# Patient Record
Sex: FEMALE | Race: WHITE | NOT HISPANIC OR LATINO | Employment: OTHER | URBAN - NONMETROPOLITAN AREA
[De-identification: names, ages, dates, MRNs, and addresses within clinical notes are randomized per-mention and may not be internally consistent; named-entity substitution may affect disease eponyms.]

---

## 2017-04-25 ENCOUNTER — TRANSCRIBE ORDERS (OUTPATIENT)
Dept: ADMINISTRATIVE | Facility: HOSPITAL | Age: 72
End: 2017-04-25

## 2017-04-25 DIAGNOSIS — Z12.31 VISIT FOR SCREENING MAMMOGRAM: Primary | ICD-10-CM

## 2017-04-25 DIAGNOSIS — H53.453 OTHER LOCALIZED VISUAL FIELD DEFECT, BILATERAL: ICD-10-CM

## 2017-05-01 ENCOUNTER — HOSPITAL ENCOUNTER (OUTPATIENT)
Dept: MRI IMAGING | Facility: HOSPITAL | Age: 72
Discharge: HOME | End: 2017-05-01
Admitting: FAMILY MEDICINE

## 2017-05-01 ENCOUNTER — HOSPITAL ENCOUNTER (OUTPATIENT)
Dept: MAMMOGRAPHY | Facility: HOSPITAL | Age: 72
Discharge: HOME | End: 2017-05-01

## 2017-05-01 DIAGNOSIS — Z12.31 VISIT FOR SCREENING MAMMOGRAM: ICD-10-CM

## 2017-05-01 DIAGNOSIS — H53.453 OTHER LOCALIZED VISUAL FIELD DEFECT, BILATERAL: ICD-10-CM

## 2017-05-01 PROCEDURE — G0202 SCR MAMMO BI INCL CAD: HCPCS | Performed by: RADIOLOGY

## 2017-05-01 PROCEDURE — G0202 SCR MAMMO BI INCL CAD: HCPCS

## 2017-05-01 PROCEDURE — 77063 BREAST TOMOSYNTHESIS BI: CPT | Performed by: RADIOLOGY

## 2017-05-01 PROCEDURE — 70551 MRI BRAIN STEM W/O DYE: CPT | Performed by: RADIOLOGY

## 2017-05-01 PROCEDURE — 77063 BREAST TOMOSYNTHESIS BI: CPT

## 2017-05-01 PROCEDURE — 70551 MRI BRAIN STEM W/O DYE: CPT

## 2017-08-17 ENCOUNTER — TRANSCRIBE ORDERS (OUTPATIENT)
Dept: ADMINISTRATIVE | Facility: HOSPITAL | Age: 72
End: 2017-08-17

## 2017-08-17 DIAGNOSIS — R07.9 CHEST PAIN, UNSPECIFIED: Primary | ICD-10-CM

## 2017-08-31 ENCOUNTER — HOSPITAL ENCOUNTER (OUTPATIENT)
Dept: NUCLEAR MEDICINE | Facility: HOSPITAL | Age: 72
Discharge: HOME | End: 2017-08-31

## 2017-08-31 ENCOUNTER — HOSPITAL ENCOUNTER (OUTPATIENT)
Dept: CARDIOLOGY | Facility: HOSPITAL | Age: 72
Discharge: HOME | End: 2017-08-31

## 2017-08-31 DIAGNOSIS — R07.9 CHEST PAIN, UNSPECIFIED: ICD-10-CM

## 2017-08-31 LAB
BH CV NUCLEAR PRIOR STUDY: 3
BH CV STRESS BP STAGE 1: NORMAL
BH CV STRESS DURATION MIN STAGE 1: 3
BH CV STRESS DURATION SEC STAGE 1: 0
BH CV STRESS GRADE STAGE 1: 10
BH CV STRESS GRADE STAGE 2: 12
BH CV STRESS HR STAGE 1: 149
BH CV STRESS METS STAGE 1: 5
BH CV STRESS PROTOCOL 1: NORMAL
BH CV STRESS RECOVERY BP: NORMAL MMHG
BH CV STRESS RECOVERY HR: 84 BPM
BH CV STRESS SPEED STAGE 1: 1.7
BH CV STRESS SPEED STAGE 2: 2.5
BH CV STRESS STAGE 1: 1
BH CV STRESS STAGE 2: 2
MAXIMAL PREDICTED HEART RATE: 150 BPM
PERCENT MAX PREDICTED HR: 99.33 %
STRESS BASELINE BP: NORMAL MMHG
STRESS BASELINE HR: 68 BPM
STRESS PERCENT HR: 117 %
STRESS POST ESTIMATED WORKLOAD: 4.6 METS
STRESS POST EXERCISE DUR MIN: 3 MIN
STRESS POST EXERCISE DUR SEC: 0 SEC
STRESS POST PEAK BP: NORMAL MMHG
STRESS POST PEAK HR: 149 BPM
STRESS TARGET HR: 128 BPM

## 2017-08-31 PROCEDURE — A9500 TC99M SESTAMIBI: HCPCS | Performed by: FAMILY MEDICINE

## 2017-08-31 PROCEDURE — 0 TECHNETIUM SESTAMIBI: Performed by: FAMILY MEDICINE

## 2017-08-31 PROCEDURE — 93018 CV STRESS TEST I&R ONLY: CPT | Performed by: INTERNAL MEDICINE

## 2017-08-31 PROCEDURE — 78452 HT MUSCLE IMAGE SPECT MULT: CPT | Performed by: INTERNAL MEDICINE

## 2017-08-31 PROCEDURE — 93017 CV STRESS TEST TRACING ONLY: CPT

## 2017-08-31 PROCEDURE — 78452 HT MUSCLE IMAGE SPECT MULT: CPT

## 2017-08-31 RX ADMIN — TECHNETIUM TC-99M SESTAMIBI 1 DOSE: 1 INJECTION INTRAVENOUS at 11:30

## 2017-08-31 RX ADMIN — TECHNETIUM TC-99M SESTAMIBI 1 DOSE: 1 INJECTION INTRAVENOUS at 10:20

## 2018-06-15 ENCOUNTER — HOSPITAL ENCOUNTER (OUTPATIENT)
Dept: MAMMOGRAPHY | Facility: HOSPITAL | Age: 73
Discharge: HOME | End: 2018-06-15
Admitting: NURSE PRACTITIONER

## 2018-06-15 DIAGNOSIS — Z12.39 BREAST CANCER SCREENING: ICD-10-CM

## 2018-06-15 PROCEDURE — 77063 BREAST TOMOSYNTHESIS BI: CPT

## 2018-06-15 PROCEDURE — 77067 SCR MAMMO BI INCL CAD: CPT | Performed by: RADIOLOGY

## 2018-06-15 PROCEDURE — 77067 SCR MAMMO BI INCL CAD: CPT

## 2018-06-15 PROCEDURE — 77063 BREAST TOMOSYNTHESIS BI: CPT | Performed by: RADIOLOGY

## 2019-02-20 ENCOUNTER — APPOINTMENT (OUTPATIENT)
Dept: CT IMAGING | Facility: HOSPITAL | Age: 74
End: 2019-02-20

## 2019-02-20 ENCOUNTER — HOSPITAL ENCOUNTER (EMERGENCY)
Facility: HOSPITAL | Age: 74
Discharge: HOME | End: 2019-02-21
Attending: EMERGENCY MEDICINE | Admitting: EMERGENCY MEDICINE

## 2019-02-20 DIAGNOSIS — K52.9 COLITIS: Primary | ICD-10-CM

## 2019-02-20 LAB
ALBUMIN SERPL-MCNC: 4.4 G/DL (ref 3.4–4.8)
ALBUMIN/GLOB SERPL: 1.3 G/DL (ref 1.5–2.5)
ALP SERPL-CCNC: 121 U/L (ref 35–104)
ALT SERPL W P-5'-P-CCNC: 33 U/L (ref 10–36)
ANION GAP SERPL CALCULATED.3IONS-SCNC: 8.4 MMOL/L (ref 3.6–11.2)
AST SERPL-CCNC: 28 U/L (ref 10–30)
BACTERIA UR QL AUTO: ABNORMAL /HPF
BASOPHILS # BLD AUTO: 0.01 10*3/MM3 (ref 0–0.3)
BASOPHILS NFR BLD AUTO: 0.1 % (ref 0–2)
BILIRUB SERPL-MCNC: 0.9 MG/DL (ref 0.2–1.8)
BILIRUB UR QL STRIP: NEGATIVE
BUN BLD-MCNC: 15 MG/DL (ref 7–21)
BUN/CREAT SERPL: 19.5 (ref 7–25)
CALCIUM SPEC-SCNC: 9.6 MG/DL (ref 7.7–10)
CHLORIDE SERPL-SCNC: 103 MMOL/L (ref 99–112)
CLARITY UR: CLEAR
CO2 SERPL-SCNC: 26.6 MMOL/L (ref 24.3–31.9)
COLOR UR: YELLOW
CREAT BLD-MCNC: 0.77 MG/DL (ref 0.43–1.29)
D-LACTATE SERPL-SCNC: 1.5 MMOL/L (ref 0.5–2)
DEPRECATED RDW RBC AUTO: 43.2 FL (ref 37–54)
DEVELOPER EXPIRATION DATE: ABNORMAL
DEVELOPER LOT NUMBER: ABNORMAL
EOSINOPHIL # BLD AUTO: 0.04 10*3/MM3 (ref 0–0.7)
EOSINOPHIL NFR BLD AUTO: 0.2 % (ref 0–7)
ERYTHROCYTE [DISTWIDTH] IN BLOOD BY AUTOMATED COUNT: 13 % (ref 11.5–14.5)
EXPIRATION DATE: ABNORMAL
FECAL OCCULT BLOOD SCREEN, POC: POSITIVE
GFR SERPL CREATININE-BSD FRML MDRD: 74 ML/MIN/1.73
GLOBULIN UR ELPH-MCNC: 3.3 GM/DL
GLUCOSE BLD-MCNC: 117 MG/DL (ref 70–110)
GLUCOSE UR STRIP-MCNC: NEGATIVE MG/DL
HCT VFR BLD AUTO: 43.2 % (ref 37–47)
HGB BLD-MCNC: 14.1 G/DL (ref 12–16)
HGB UR QL STRIP.AUTO: ABNORMAL
HYALINE CASTS UR QL AUTO: ABNORMAL /LPF
IMM GRANULOCYTES # BLD AUTO: 0.05 10*3/MM3 (ref 0–0.03)
IMM GRANULOCYTES NFR BLD AUTO: 0.3 % (ref 0–0.5)
KETONES UR QL STRIP: NEGATIVE
LEUKOCYTE ESTERASE UR QL STRIP.AUTO: ABNORMAL
LIPASE SERPL-CCNC: 40 U/L (ref 13–60)
LYMPHOCYTES # BLD AUTO: 1.98 10*3/MM3 (ref 1–3)
LYMPHOCYTES NFR BLD AUTO: 12 % (ref 16–46)
Lab: ABNORMAL
MCH RBC QN AUTO: 30.9 PG (ref 27–33)
MCHC RBC AUTO-ENTMCNC: 32.6 G/DL (ref 33–37)
MCV RBC AUTO: 94.7 FL (ref 80–94)
MONOCYTES # BLD AUTO: 1.55 10*3/MM3 (ref 0.1–0.9)
MONOCYTES NFR BLD AUTO: 9.4 % (ref 0–12)
NEGATIVE CONTROL: NEGATIVE
NEUTROPHILS # BLD AUTO: 12.92 10*3/MM3 (ref 1.4–6.5)
NEUTROPHILS NFR BLD AUTO: 78 % (ref 40–75)
NITRITE UR QL STRIP: NEGATIVE
OSMOLALITY SERPL CALC.SUM OF ELEC: 277.5 MOSM/KG (ref 273–305)
PH UR STRIP.AUTO: 7 [PH] (ref 5–8)
PLATELET # BLD AUTO: 247 10*3/MM3 (ref 130–400)
PMV BLD AUTO: 10.2 FL (ref 6–10)
POSITIVE CONTROL: POSITIVE
POTASSIUM BLD-SCNC: 3.9 MMOL/L (ref 3.5–5.3)
PROT SERPL-MCNC: 7.7 G/DL (ref 6–8)
PROT UR QL STRIP: NEGATIVE
RBC # BLD AUTO: 4.56 10*6/MM3 (ref 4.2–5.4)
RBC # UR: ABNORMAL /HPF
REF LAB TEST METHOD: ABNORMAL
SODIUM BLD-SCNC: 138 MMOL/L (ref 135–153)
SP GR UR STRIP: >1.03 (ref 1–1.03)
SQUAMOUS #/AREA URNS HPF: ABNORMAL /HPF
UROBILINOGEN UR QL STRIP: ABNORMAL
WBC NRBC COR # BLD: 16.55 10*3/MM3 (ref 4.5–12.5)
WBC UR QL AUTO: ABNORMAL /HPF

## 2019-02-20 PROCEDURE — 96366 THER/PROPH/DIAG IV INF ADDON: CPT

## 2019-02-20 PROCEDURE — 96365 THER/PROPH/DIAG IV INF INIT: CPT

## 2019-02-20 PROCEDURE — 25010000002 IOPAMIDOL 61 % SOLUTION: Performed by: EMERGENCY MEDICINE

## 2019-02-20 PROCEDURE — 83605 ASSAY OF LACTIC ACID: CPT | Performed by: EMERGENCY MEDICINE

## 2019-02-20 PROCEDURE — 74177 CT ABD & PELVIS W/CONTRAST: CPT | Performed by: RADIOLOGY

## 2019-02-20 PROCEDURE — 36415 COLL VENOUS BLD VENIPUNCTURE: CPT

## 2019-02-20 PROCEDURE — 96375 TX/PRO/DX INJ NEW DRUG ADDON: CPT

## 2019-02-20 PROCEDURE — 25010000002 ONDANSETRON PER 1 MG: Performed by: EMERGENCY MEDICINE

## 2019-02-20 PROCEDURE — 83690 ASSAY OF LIPASE: CPT | Performed by: EMERGENCY MEDICINE

## 2019-02-20 PROCEDURE — 85025 COMPLETE CBC W/AUTO DIFF WBC: CPT | Performed by: EMERGENCY MEDICINE

## 2019-02-20 PROCEDURE — 74177 CT ABD & PELVIS W/CONTRAST: CPT

## 2019-02-20 PROCEDURE — 82270 OCCULT BLOOD FECES: CPT | Performed by: EMERGENCY MEDICINE

## 2019-02-20 PROCEDURE — 81001 URINALYSIS AUTO W/SCOPE: CPT | Performed by: EMERGENCY MEDICINE

## 2019-02-20 PROCEDURE — 99284 EMERGENCY DEPT VISIT MOD MDM: CPT

## 2019-02-20 PROCEDURE — 25010000002 LEVOFLOXACIN PER 250 MG: Performed by: EMERGENCY MEDICINE

## 2019-02-20 PROCEDURE — 96361 HYDRATE IV INFUSION ADD-ON: CPT

## 2019-02-20 PROCEDURE — 80053 COMPREHEN METABOLIC PANEL: CPT | Performed by: EMERGENCY MEDICINE

## 2019-02-20 PROCEDURE — 96368 THER/DIAG CONCURRENT INF: CPT

## 2019-02-20 RX ORDER — LEVOFLOXACIN 5 MG/ML
750 INJECTION, SOLUTION INTRAVENOUS ONCE
Status: COMPLETED | OUTPATIENT
Start: 2019-02-20 | End: 2019-02-21

## 2019-02-20 RX ORDER — DICYCLOMINE HCL 20 MG
20 TABLET ORAL EVERY 6 HOURS PRN
Qty: 20 TABLET | Refills: 0 | Status: SHIPPED | OUTPATIENT
Start: 2019-02-20

## 2019-02-20 RX ORDER — ONDANSETRON 2 MG/ML
4 INJECTION INTRAMUSCULAR; INTRAVENOUS ONCE
Status: COMPLETED | OUTPATIENT
Start: 2019-02-20 | End: 2019-02-20

## 2019-02-20 RX ORDER — SODIUM CHLORIDE 9 MG/ML
100 INJECTION, SOLUTION INTRAVENOUS CONTINUOUS
Status: DISCONTINUED | OUTPATIENT
Start: 2019-02-20 | End: 2019-02-21 | Stop reason: HOSPADM

## 2019-02-20 RX ORDER — ONDANSETRON 4 MG/1
4 TABLET, ORALLY DISINTEGRATING ORAL 4 TIMES DAILY
Qty: 15 TABLET | Refills: 0 | Status: SHIPPED | OUTPATIENT
Start: 2019-02-20

## 2019-02-20 RX ORDER — SODIUM CHLORIDE 0.9 % (FLUSH) 0.9 %
10 SYRINGE (ML) INJECTION AS NEEDED
Status: DISCONTINUED | OUTPATIENT
Start: 2019-02-20 | End: 2019-02-21 | Stop reason: HOSPADM

## 2019-02-20 RX ORDER — CIPROFLOXACIN 500 MG/1
500 TABLET, FILM COATED ORAL 2 TIMES DAILY
Qty: 20 TABLET | Refills: 0 | Status: SHIPPED | OUTPATIENT
Start: 2019-02-20

## 2019-02-20 RX ORDER — METRONIDAZOLE 500 MG/1
500 TABLET ORAL 3 TIMES DAILY
Qty: 30 TABLET | Refills: 0 | Status: SHIPPED | OUTPATIENT
Start: 2019-02-20

## 2019-02-20 RX ADMIN — IOPAMIDOL 90 ML: 612 INJECTION, SOLUTION INTRAVENOUS at 21:42

## 2019-02-20 RX ADMIN — ONDANSETRON 4 MG: 2 INJECTION, SOLUTION INTRAMUSCULAR; INTRAVENOUS at 20:55

## 2019-02-20 RX ADMIN — METRONIDAZOLE 500 MG: 500 INJECTION, SOLUTION INTRAVENOUS at 23:12

## 2019-02-20 RX ADMIN — SODIUM CHLORIDE 100 ML/HR: 9 INJECTION, SOLUTION INTRAVENOUS at 20:55

## 2019-02-20 RX ADMIN — LEVOFLOXACIN 750 MG: 5 INJECTION, SOLUTION INTRAVENOUS at 23:39

## 2019-02-21 VITALS
HEART RATE: 79 BPM | DIASTOLIC BLOOD PRESSURE: 55 MMHG | RESPIRATION RATE: 18 BRPM | OXYGEN SATURATION: 97 % | BODY MASS INDEX: 18.43 KG/M2 | WEIGHT: 104 LBS | SYSTOLIC BLOOD PRESSURE: 116 MMHG | HEIGHT: 63 IN | TEMPERATURE: 97.7 F

## 2019-02-21 NOTE — ED NOTES
Pt left AC is swollen around IV site that infiltrated, pt has full ROM of extremity, denies tenderness to area and there is no discoloration to area noted, no blistering, necrosis or sloughing noted. Provider made aware, states pt is fine to go home. I advised pt to apply warm compress to reduce swelling, if condition gets worse she can return to ER. PT denies any further questions.      Geraldine Matta, RN  02/21/19 0126

## 2019-02-21 NOTE — ED NOTES
Pt resting quietly on stretcher with no complaints.  Pt AAOx4 with no resp distress noted, respirations even and unlabored.  Pt denies any needs at this time.  Skin PWD.  Pt family at bedside. Will continue to monitor and follow plan of care.  Bed rails up x2, bed in lowest position, call light in reach.           Geraldine Matta, RN  02/21/19 [id number]

## 2019-02-21 NOTE — ED PROVIDER NOTES
Subjective   Pt comes in with c/o BRBPR.  Pt reports that she felt severe nausea yesterday.  She later had diarrhea.  Today she had dark red blood and clots in diarrhea.  No fever.  No injury.  Had 1 polyp removed many years ago.  No blood thinners.        History provided by:  Patient and relative  Rectal Bleeding   Quality:  Maroon  Amount:  Moderate  Duration:  1 day  Timing:  Intermittent  Chronicity:  New  Context: defecation and diarrhea    Context: not anal fissures, not anal penetration, not constipation, not foreign body, not hemorrhoids, not rectal injury, not rectal pain and not spontaneously    Similar prior episodes: no    Relieved by:  Nothing  Worsened by:  Defecation  Ineffective treatments:  None tried  Associated symptoms: abdominal pain, dizziness and light-headedness    Associated symptoms: no epistaxis, no fever, no hematemesis, no loss of consciousness, no recent illness and no vomiting    Risk factors: no anticoagulant use, no hx of colorectal cancer, no hx of colorectal surgery, no hx of IBD, no NSAID use and no steroid use        Review of Systems   Constitutional: Positive for fatigue. Negative for activity change, appetite change, chills, diaphoresis and fever.   HENT: Negative.  Negative for nosebleeds.    Eyes: Negative.    Respiratory: Negative.  Negative for chest tightness and shortness of breath.    Cardiovascular: Negative.  Negative for chest pain, palpitations and leg swelling.   Gastrointestinal: Positive for abdominal pain, blood in stool, constipation, diarrhea and hematochezia. Negative for abdominal distention, hematemesis and vomiting.   Endocrine: Negative.    Genitourinary: Negative.    Musculoskeletal: Negative.    Skin: Negative.    Allergic/Immunologic: Negative.    Neurological: Positive for dizziness, weakness and light-headedness. Negative for loss of consciousness.   Hematological: Negative.    Psychiatric/Behavioral: Negative.    All other systems reviewed and are  negative.      No past medical history on file.    No Known Allergies    No past surgical history on file.    Family History   Problem Relation Age of Onset   • Breast cancer Sister 47       Social History     Socioeconomic History   • Marital status:      Spouse name: Not on file   • Number of children: Not on file   • Years of education: Not on file   • Highest education level: Not on file   Tobacco Use   • Smoking status: Never Smoker           Objective   Physical Exam   Constitutional: She is oriented to person, place, and time. She appears well-developed and well-nourished. No distress.   HENT:   Head: Normocephalic and atraumatic.   Nose: Nose normal.   Mouth/Throat: Oropharynx is clear and moist.   Eyes: Conjunctivae and EOM are normal. Right eye exhibits no discharge. Left eye exhibits no discharge. No scleral icterus.   Neck: Normal range of motion. Neck supple. No tracheal deviation present.   Cardiovascular: Normal rate, regular rhythm, normal heart sounds and intact distal pulses. Exam reveals no gallop and no friction rub.   No murmur heard.  Pulmonary/Chest: Effort normal and breath sounds normal. No stridor. No respiratory distress. She has no wheezes. She has no rales.   Abdominal: Soft. Bowel sounds are normal. She exhibits no distension and no mass. There is tenderness. There is no guarding.   Low abdominal mild tenderness to palpation   Genitourinary: Rectal exam shows guaiac positive stool.   Genitourinary Comments: No CVAT    Rectal performed with TENZIN Matta  Small non-engorged, non-thrombosed, Non-bleeding left lateral hemorrhoid.  Normal tone.  Soft/liquid dark maroon grossly positive stool with + hemoccult.   Musculoskeletal: Normal range of motion.   Neurological: She is alert and oriented to person, place, and time. No sensory deficit. She exhibits normal muscle tone. Coordination normal.   Skin: Skin is warm and dry. Capillary refill takes less than 2 seconds. She is not  diaphoretic. No pallor.   Psychiatric: She has a normal mood and affect. Her behavior is normal. Judgment and thought content normal.   Nursing note and vitals reviewed.      Procedures           ED Course      CT Abdomen Pelvis With Contrast   ED Interpretation   ET abdomen and pelvis with IV contrast   Faxed report from virtual radiologic   Impression:   1.  Diffuse wall thickening of the majority of the transverse colon with mild pericolonic stranding indicating a colitis.   2.  Scattered: Diverticuli without evidence of diverticulitis.   3.  Approximate 7.1 x 8.0 x 7.3 cm subserosal leiomyoma of the anterior uterus.   Signed Tuan Truong M.D.        Labs Reviewed   COMPREHENSIVE METABOLIC PANEL - Abnormal; Notable for the following components:       Result Value    Glucose 117 (*)     Alkaline Phosphatase 121 (*)     A/G Ratio 1.3 (*)     All other components within normal limits    Narrative:     The MDRD GFR formula is only valid for adults with stable renal function between ages 18 and 70.   URINALYSIS W/ MICROSCOPIC IF INDICATED (NO CULTURE) - Abnormal; Notable for the following components:    Specific Gravity, UA >1.030 (*)     Blood, UA Small (1+) (*)     Leuk Esterase, UA Moderate (2+) (*)     All other components within normal limits   CBC WITH AUTO DIFFERENTIAL - Abnormal; Notable for the following components:    WBC 16.55 (*)     MCV 94.7 (*)     MCHC 32.6 (*)     MPV 10.2 (*)     Neutrophil % 78.0 (*)     Lymphocyte % 12.0 (*)     Neutrophils, Absolute 12.92 (*)     Monocytes, Absolute 1.55 (*)     Immature Grans, Absolute 0.05 (*)     All other components within normal limits   URINALYSIS, MICROSCOPIC ONLY - Abnormal; Notable for the following components:    RBC, UA 6-12 (*)     WBC, UA 13-20 (*)     All other components within normal limits   POCT OCCULT BLOOD STOOL - Abnormal; Notable for the following components:    Fecal Occult Blood Positive (*)     All other components within normal limits    LIPASE - Normal   LACTIC ACID, PLASMA - Normal   OSMOLALITY, CALCULATED - Normal   CBC AND DIFFERENTIAL    Narrative:     The following orders were created for panel order CBC & Differential.  Procedure                               Abnormality         Status                     ---------                               -----------         ------                     CBC Auto Differential[[id number]]        Abnormal            Final result                 Please view results for these tests on the individual orders.        Medication List      START taking these medications    ciprofloxacin 500 MG tablet  Commonly known as:  CIPRO  Take 1 tablet by mouth 2 (Two) Times a Day.     dicyclomine 20 MG tablet  Commonly known as:  BENTYL  Take 1 tablet by mouth Every 6 (Six) Hours As Needed (abdominal cramps).     metroNIDAZOLE 500 MG tablet  Commonly known as:  FLAGYL  Take 1 tablet by mouth 3 (Three) Times a Day.     ondansetron ODT 4 MG disintegrating tablet  Commonly known as:  ZOFRAN-ODT  Take 1 tablet by mouth 4 (Four) Times a Day.                    MDM  Number of Diagnoses or Management Options  Colitis: new and requires workup     Amount and/or Complexity of Data Reviewed  Clinical lab tests: ordered and reviewed  Tests in the radiology section of CPT®: ordered and reviewed  Independent visualization of images, tracings, or specimens: yes    Risk of Complications, Morbidity, and/or Mortality  Presenting problems: high  Diagnostic procedures: high  Management options: high    Patient Progress  Patient progress: stable        Final diagnoses:   Colitis            Corby Delgadillo MD  02/20/19 [id number]

## 2019-02-21 NOTE — ED NOTES
Pt resting quietly on stretcher with no complaints.  Pt AAOx4 with no resp distress noted, respirations even and unlabored.  Pt denies any needs at this time.  Skin PWD.  Pt family at bedside. Will continue to monitor and follow plan of care.  Bed rails up x2, bed in lowest position, call light in reach.           Geraldine Matta, RN  02/21/19 0054

## 2019-02-21 NOTE — ED NOTES
Confirmed IV compatability for Levaquin and Flagyl via [employer].      Geraldine Matta, RN  02/20/19 6708

## 2019-02-21 NOTE — ED NOTES
I gave the patient a urine cup and informed her we need a sample, patient doesn't believe she can go right now because has not had much to drink today.     Eduin Chaparro  02/20/19 2110

## 2019-02-21 NOTE — ED NOTES
Pt resting quietly on stretcher with no complaints.  Pt AAOx4 with no resp distress noted, respirations even and unlabored.  Pt denies any needs at this time.  Skin PWD.  Pt family at bedside. Will continue to monitor and follow plan of care.  Bed rails up x2, bed in lowest position, call light in reach.           Geraldine Matta, RN  02/21/19 2324

## 2019-02-21 NOTE — ED NOTES
Pt iv infiltrated during CT, radiology tech reports that it occurred at the end of the scan and only around 15 ml of contrast was administered after the IV blew. Area around the IV is slightly raised, blue and slightly tender to the touch. Provider made aware, orders initiated.      Geraldine Matta, RN  02/20/19 2958

## 2019-03-21 ENCOUNTER — TRANSCRIBE ORDERS (OUTPATIENT)
Dept: ADMINISTRATIVE | Facility: HOSPITAL | Age: 74
End: 2019-03-21

## 2019-03-21 DIAGNOSIS — N85.2 HYPERTROPHY OF UTERUS: Primary | ICD-10-CM

## 2019-03-27 ENCOUNTER — HOSPITAL ENCOUNTER (OUTPATIENT)
Dept: ULTRASOUND IMAGING | Facility: HOSPITAL | Age: 74
Discharge: HOME | End: 2019-03-27
Admitting: FAMILY MEDICINE

## 2019-03-27 DIAGNOSIS — N85.2 HYPERTROPHY OF UTERUS: ICD-10-CM

## 2019-03-27 PROCEDURE — 76830 TRANSVAGINAL US NON-OB: CPT | Performed by: RADIOLOGY

## 2019-03-27 PROCEDURE — 76830 TRANSVAGINAL US NON-OB: CPT

## 2019-04-25 ENCOUNTER — TRANSCRIBE ORDERS (OUTPATIENT)
Dept: ADMINISTRATIVE | Facility: HOSPITAL | Age: 74
End: 2019-04-25

## 2019-04-25 DIAGNOSIS — R63.4 ABNORMAL WEIGHT LOSS: Primary | ICD-10-CM

## 2019-04-29 ENCOUNTER — HOSPITAL ENCOUNTER (OUTPATIENT)
Dept: CT IMAGING | Facility: HOSPITAL | Age: 74
Discharge: HOME | End: 2019-04-29
Admitting: NURSE PRACTITIONER

## 2019-04-29 DIAGNOSIS — R63.4 ABNORMAL WEIGHT LOSS: ICD-10-CM

## 2019-04-29 LAB — CREAT BLDA-MCNC: 0.7 MG/DL (ref 0.6–1.3)

## 2019-04-29 PROCEDURE — 74177 CT ABD & PELVIS W/CONTRAST: CPT

## 2019-04-29 PROCEDURE — 74177 CT ABD & PELVIS W/CONTRAST: CPT | Performed by: RADIOLOGY

## 2019-04-29 PROCEDURE — 82565 ASSAY OF CREATININE: CPT

## 2019-04-29 PROCEDURE — 25010000002 IOPAMIDOL 61 % SOLUTION: Performed by: NURSE PRACTITIONER

## 2019-04-29 RX ADMIN — IOPAMIDOL 80 ML: 612 INJECTION, SOLUTION INTRAVENOUS at 14:13

## 2019-06-17 ENCOUNTER — HOSPITAL ENCOUNTER (OUTPATIENT)
Dept: MAMMOGRAPHY | Facility: HOSPITAL | Age: 74
Discharge: HOME | End: 2019-06-17
Admitting: FAMILY MEDICINE

## 2019-06-17 DIAGNOSIS — Z12.39 SCREENING BREAST EXAMINATION: ICD-10-CM

## 2019-06-17 PROCEDURE — 77067 SCR MAMMO BI INCL CAD: CPT

## 2019-06-17 PROCEDURE — 77067 SCR MAMMO BI INCL CAD: CPT | Performed by: RADIOLOGY

## 2019-06-17 PROCEDURE — 77063 BREAST TOMOSYNTHESIS BI: CPT | Performed by: RADIOLOGY

## 2019-06-17 PROCEDURE — 77063 BREAST TOMOSYNTHESIS BI: CPT

## 2020-09-08 ENCOUNTER — HOSPITAL ENCOUNTER (OUTPATIENT)
Dept: MAMMOGRAPHY | Facility: HOSPITAL | Age: 75
Discharge: HOME | End: 2020-09-08
Admitting: FAMILY MEDICINE

## 2020-09-08 DIAGNOSIS — Z12.31 VISIT FOR SCREENING MAMMOGRAM: ICD-10-CM

## 2020-09-08 PROCEDURE — 77063 BREAST TOMOSYNTHESIS BI: CPT

## 2020-09-08 PROCEDURE — 77063 BREAST TOMOSYNTHESIS BI: CPT | Performed by: RADIOLOGY

## 2020-09-08 PROCEDURE — 77067 SCR MAMMO BI INCL CAD: CPT

## 2020-09-08 PROCEDURE — 77067 SCR MAMMO BI INCL CAD: CPT | Performed by: RADIOLOGY

## 2021-02-17 ENCOUNTER — IMMUNIZATION (OUTPATIENT)
Dept: VACCINE CLINIC | Facility: HOSPITAL | Age: 76
End: 2021-02-17

## 2021-02-17 PROCEDURE — 0001A: CPT | Performed by: INTERNAL MEDICINE

## 2021-02-17 PROCEDURE — 91300 HC SARSCOV02 VAC 30MCG/0.3ML IM: CPT | Performed by: INTERNAL MEDICINE

## 2021-03-10 ENCOUNTER — IMMUNIZATION (OUTPATIENT)
Dept: VACCINE CLINIC | Facility: HOSPITAL | Age: 76
End: 2021-03-10

## 2021-03-10 PROCEDURE — 91300 HC SARSCOV02 VAC 30MCG/0.3ML IM: CPT | Performed by: INTERNAL MEDICINE

## 2021-03-10 PROCEDURE — 0002A: CPT | Performed by: INTERNAL MEDICINE

## 2021-09-14 ENCOUNTER — HOSPITAL ENCOUNTER (OUTPATIENT)
Dept: MAMMOGRAPHY | Facility: HOSPITAL | Age: 76
Discharge: HOME | End: 2021-09-14
Admitting: FAMILY MEDICINE

## 2021-09-14 DIAGNOSIS — Z12.31 VISIT FOR SCREENING MAMMOGRAM: ICD-10-CM

## 2021-09-14 PROCEDURE — 77067 SCR MAMMO BI INCL CAD: CPT | Performed by: RADIOLOGY

## 2021-09-14 PROCEDURE — 77063 BREAST TOMOSYNTHESIS BI: CPT | Performed by: RADIOLOGY

## 2021-09-14 PROCEDURE — 77067 SCR MAMMO BI INCL CAD: CPT

## 2021-09-14 PROCEDURE — 77063 BREAST TOMOSYNTHESIS BI: CPT

## 2022-09-16 ENCOUNTER — HOSPITAL ENCOUNTER (OUTPATIENT)
Dept: MAMMOGRAPHY | Facility: HOSPITAL | Age: 77
Discharge: HOME | End: 2022-09-16
Admitting: FAMILY MEDICINE

## 2022-09-16 DIAGNOSIS — Z12.31 VISIT FOR SCREENING MAMMOGRAM: ICD-10-CM

## 2022-09-16 PROCEDURE — 77063 BREAST TOMOSYNTHESIS BI: CPT

## 2022-09-16 PROCEDURE — 77067 SCR MAMMO BI INCL CAD: CPT

## 2022-09-16 PROCEDURE — 77067 SCR MAMMO BI INCL CAD: CPT | Performed by: RADIOLOGY

## 2022-09-16 PROCEDURE — 77063 BREAST TOMOSYNTHESIS BI: CPT | Performed by: RADIOLOGY

## 2023-07-25 ENCOUNTER — HOSPITAL ENCOUNTER (OUTPATIENT)
Dept: ULTRASOUND IMAGING | Facility: HOSPITAL | Age: 78
Discharge: HOME | End: 2023-07-25
Admitting: FAMILY MEDICINE
Payer: MEDICARE

## 2023-07-25 DIAGNOSIS — R74.01 ELEVATION OF LEVELS OF LIVER TRANSAMINASE LEVELS: ICD-10-CM

## 2023-07-25 PROCEDURE — 76705 ECHO EXAM OF ABDOMEN: CPT

## 2023-07-25 PROCEDURE — 76705 ECHO EXAM OF ABDOMEN: CPT | Performed by: RADIOLOGY

## 2023-09-06 NOTE — PROGRESS NOTES
DATE OF CONSULTATION:  9/12/2023    REFERRING PHYSICIAN:  Riley Rodriguez, *    CHIEF COMPLAINT:  Chief Complaint   Patient presents with    Elevated Hepatic Enzymes       HISTORY OF PRESENT ILLNESS:   Nila Cho is a very pleasant 78 y.o. female who is being seen today at the request of Riley Rodriguez, * for evaluation and treatment of cirrhosis.  Patient states that she was just recently given this diagnosis by her primary care provider after lab work and imaging was performed.  She underwent ultrasound imaging back in July that revealed a heterogenous liver echotexture.  She also had some mildly elevated LFTs on blood work performed on 7/28/2023.  Patient states that she is very concerned because she does have a significant family history of liver, pancreatic and colon cancers.  She denies any alcohol abuse or excessive acetaminophen use.  She has never been told in the past that she has fatty liver.  She is unsure of how long her liver enzymes have been elevated but believes it to be a fairly new finding.Patient currently denies any abdominal swelling or lower extremity swelling.  Denies any confusion or disorientation.  She denies any jaundice or pruritus.  Her son states that he did have hepatitis C due to blood transfusions and is concerned that his mother could have contracted it resulting in her cirrhosis.  She has never had any hepatitis labs performed.    REVIEW OF SYSTEMS:   Review of Systems   Constitutional:  Negative for appetite change and unexpected weight change.   HENT:  Negative for trouble swallowing.    Eyes: Negative.    Respiratory: Negative.     Cardiovascular: Negative.    Gastrointestinal:  Negative for abdominal distention, abdominal pain, constipation, diarrhea, nausea and vomiting.   Endocrine: Negative.    Genitourinary: Negative.    Musculoskeletal: Negative.    Skin: Negative.    Allergic/Immunologic: Negative.    Neurological: Negative.    Hematological: Negative.   "  Psychiatric/Behavioral: Negative.       PAST MEDICAL HISTORY:  History reviewed. No pertinent past medical history.    PAST SURGICAL HISTORY:  History reviewed. No pertinent surgical history.    FAMILY HISTORY:  Family History   Problem Relation Age of Onset    Breast cancer Sister 47       SOCIAL HISTORY:  Social History     Socioeconomic History    Marital status:    Tobacco Use    Smoking status: Never       MEDICATIONS:      Current Outpatient Medications:     ALPRAZolam (XANAX) 0.25 MG tablet, , Disp: , Rfl:     ondansetron ODT (ZOFRAN-ODT) 4 MG disintegrating tablet, Take 1 tablet by mouth 4 (Four) Times a Day., Disp: 15 tablet, Rfl: 0    pantoprazole (PROTONIX) 40 MG EC tablet, , Disp: , Rfl:     sertraline (ZOLOFT) 25 MG tablet, Take 1 tablet by mouth Daily., Disp: , Rfl:     ALLERGIES:  No Known Allergies    VISIT VITALS/PHYSICAL EXAM:  Ht 160 cm (63\")   Wt 47 kg (103 lb 9.6 oz)   BMI 18.35 kg/m²   Physical Exam  Constitutional:       General: She is not in acute distress.     Appearance: Normal appearance. She is well-developed.   HENT:      Head: Normocephalic and atraumatic.   Eyes:      Pupils: Pupils are equal, round, and reactive to light.   Pulmonary:      Effort: Pulmonary effort is normal. No respiratory distress.   Abdominal:      General: Abdomen is flat. There is no distension.      Palpations: Abdomen is soft. There is no mass.      Tenderness: There is no abdominal tenderness. There is no guarding or rebound.      Hernia: No hernia is present.   Musculoskeletal:         General: No swelling. Normal range of motion.      Cervical back: Normal range of motion.   Skin:     General: Skin is warm and dry.   Neurological:      Mental Status: She is alert and oriented to person, place, and time.   Psychiatric:         Attention and Perception: Attention normal.         Mood and Affect: Mood normal.         Speech: Speech normal.         Behavior: Behavior normal. Behavior is cooperative.   "       Thought Content: Thought content normal.         PATHOLOGY:   NONE      ENDOSCOPY:  NONE      IMAGING:     US Liver    Result Date: 7/25/2023  Heterogeneous liver echotexture which is nonspecific but can be seen with underlying hepatocellular disease. Otherwise unremarkable exam.  This report was finalized on 7/25/2023 11:37 AM by Dr. Gino Stokes MD.         RECENT LABS:  Lab Results   Component Value Date    WBC 8.12 09/08/2023    HGB 14.1 09/08/2023    HCT 41.3 09/08/2023    MCV 92.0 09/08/2023    RDW 11.8 (L) 09/08/2023     09/08/2023    NEUTRORELPCT 68.4 09/08/2023    LYMPHORELPCT 20.8 09/08/2023    MONORELPCT 9.1 09/08/2023    EOSRELPCT 0.9 09/08/2023    BASORELPCT 0.6 09/08/2023    NEUTROABS 5.55 09/08/2023    LYMPHSABS 1.69 09/08/2023       Lab Results   Component Value Date     09/08/2023    K 4.5 09/08/2023    CO2 25.0 09/08/2023     09/08/2023    BUN 10 09/08/2023    CREATININE 0.78 09/08/2023    EGFRIFNONA 74 02/20/2019    GLUCOSE 78 09/08/2023    CALCIUM 9.7 09/08/2023    ALKPHOS 121 (H) 09/08/2023    AST 23 09/08/2023    ALT 15 09/08/2023    BILITOT 0.3 09/08/2023    ALBUMIN 4.6 09/08/2023    PROTEINTOT 8.1 09/08/2023         ASSESSMENT & PLAN:  Patient's recent labs as well as ultrasound imaging performed by her primary care provider were reviewed with her-she voiced understanding of these results.  We extensively discussed etiology of cirrhosis and treatment plan.  At this time we will obtain labs while in clinic-she will be contacted when available.   Diagnosis Plan   1. Elevated liver function tests  Hepatitis Panel, Acute    Protime-INR    LUKE Fibrosure Plus    AFP Tumor Marker    Anti-microsomal Antibody    Anti-Smooth Muscle Antibody Titer    Mitochondrial Antibodies, M2    CBC & Differential    Comprehensive Metabolic Panel    Ferritin    Iron Profile      2. Cirrhosis of liver without ascites, unspecified hepatic cirrhosis type  Hepatitis Panel, Acute    Protime-INR     AFP Tumor Marker    Anti-microsomal Antibody    Anti-Smooth Muscle Antibody Titer    Mitochondrial Antibodies, M2    CBC & Differential    Comprehensive Metabolic Panel    Ferritin    Iron Profile      3. Liver disease, unspecified  LUKE Fibrosure Plus      4. Mixed hyperlipidemia  LUKE Fibrosure Plus          Return in about 3 months (around 12/8/2023).          Electronically Signed by: Robert Storm PA-C , September 12, 2023 08:22 EDT       CC:   Riley Rodriguez, *  Riley Rodriguez MD    Thank you so much for allowing us to participate in the care of Nila Cho . Please do not hesitate to contact us with any questions or concerns.

## 2023-09-08 ENCOUNTER — OFFICE VISIT (OUTPATIENT)
Dept: GASTROENTEROLOGY | Facility: CLINIC | Age: 78
End: 2023-09-08
Payer: MEDICARE

## 2023-09-08 VITALS — BODY MASS INDEX: 18.36 KG/M2 | WEIGHT: 103.6 LBS | HEIGHT: 63 IN

## 2023-09-08 DIAGNOSIS — K76.9 LIVER DISEASE, UNSPECIFIED: ICD-10-CM

## 2023-09-08 DIAGNOSIS — E78.2 MIXED HYPERLIPIDEMIA: ICD-10-CM

## 2023-09-08 DIAGNOSIS — R79.89 ELEVATED LIVER FUNCTION TESTS: Primary | ICD-10-CM

## 2023-09-08 DIAGNOSIS — K74.60 CIRRHOSIS OF LIVER WITHOUT ASCITES, UNSPECIFIED HEPATIC CIRRHOSIS TYPE: ICD-10-CM

## 2023-09-08 LAB
INR PPP: 0.96 (ref 0.9–1.1)
PROTHROMBIN TIME: 13.2 SECONDS (ref 12.1–14.7)

## 2023-09-08 PROCEDURE — 83010 ASSAY OF HAPTOGLOBIN QUANT: CPT | Performed by: PHYSICIAN ASSISTANT

## 2023-09-08 PROCEDURE — 82728 ASSAY OF FERRITIN: CPT | Performed by: PHYSICIAN ASSISTANT

## 2023-09-08 PROCEDURE — 84466 ASSAY OF TRANSFERRIN: CPT | Performed by: PHYSICIAN ASSISTANT

## 2023-09-08 PROCEDURE — 82977 ASSAY OF GGT: CPT | Performed by: PHYSICIAN ASSISTANT

## 2023-09-08 PROCEDURE — 86381 MITOCHONDRIAL ANTIBODY EACH: CPT | Performed by: PHYSICIAN ASSISTANT

## 2023-09-08 PROCEDURE — 85610 PROTHROMBIN TIME: CPT | Performed by: PHYSICIAN ASSISTANT

## 2023-09-08 PROCEDURE — 82105 ALPHA-FETOPROTEIN SERUM: CPT | Performed by: PHYSICIAN ASSISTANT

## 2023-09-08 PROCEDURE — 83540 ASSAY OF IRON: CPT | Performed by: PHYSICIAN ASSISTANT

## 2023-09-08 PROCEDURE — 82465 ASSAY BLD/SERUM CHOLESTEROL: CPT | Performed by: PHYSICIAN ASSISTANT

## 2023-09-08 PROCEDURE — 84478 ASSAY OF TRIGLYCERIDES: CPT | Performed by: PHYSICIAN ASSISTANT

## 2023-09-08 PROCEDURE — 80074 ACUTE HEPATITIS PANEL: CPT | Performed by: PHYSICIAN ASSISTANT

## 2023-09-08 PROCEDURE — 80053 COMPREHEN METABOLIC PANEL: CPT | Performed by: PHYSICIAN ASSISTANT

## 2023-09-08 PROCEDURE — 82947 ASSAY GLUCOSE BLOOD QUANT: CPT | Performed by: PHYSICIAN ASSISTANT

## 2023-09-08 PROCEDURE — 83883 ASSAY NEPHELOMETRY NOT SPEC: CPT | Performed by: PHYSICIAN ASSISTANT

## 2023-09-08 PROCEDURE — 82172 ASSAY OF APOLIPOPROTEIN: CPT | Performed by: PHYSICIAN ASSISTANT

## 2023-09-08 PROCEDURE — 86376 MICROSOMAL ANTIBODY EACH: CPT | Performed by: PHYSICIAN ASSISTANT

## 2023-09-08 PROCEDURE — 82247 BILIRUBIN TOTAL: CPT | Performed by: PHYSICIAN ASSISTANT

## 2023-09-08 PROCEDURE — 86015 ACTIN ANTIBODY EACH: CPT | Performed by: PHYSICIAN ASSISTANT

## 2023-09-08 PROCEDURE — 85025 COMPLETE CBC W/AUTO DIFF WBC: CPT | Performed by: PHYSICIAN ASSISTANT

## 2023-09-08 RX ORDER — ALPRAZOLAM 0.25 MG/1
TABLET ORAL
COMMUNITY
Start: 2023-05-30

## 2023-09-08 RX ORDER — PANTOPRAZOLE SODIUM 40 MG/1
TABLET, DELAYED RELEASE ORAL
COMMUNITY
Start: 2023-07-04

## 2023-09-08 RX ORDER — SERTRALINE HYDROCHLORIDE 25 MG/1
1 TABLET, FILM COATED ORAL DAILY
COMMUNITY
Start: 2023-08-20

## 2023-09-09 LAB
ALBUMIN SERPL-MCNC: 4.6 G/DL (ref 3.5–5.2)
ALBUMIN/GLOB SERPL: 1.3 G/DL
ALP SERPL-CCNC: 121 U/L (ref 39–117)
ALPHA-FETOPROTEIN: 10.2 NG/ML (ref 0–8.3)
ALT SERPL W P-5'-P-CCNC: 15 U/L (ref 1–33)
ANION GAP SERPL CALCULATED.3IONS-SCNC: 12 MMOL/L (ref 5–15)
AST SERPL-CCNC: 23 U/L (ref 1–32)
BASOPHILS # BLD AUTO: 0.05 10*3/MM3 (ref 0–0.2)
BASOPHILS NFR BLD AUTO: 0.6 % (ref 0–1.5)
BILIRUB SERPL-MCNC: 0.3 MG/DL (ref 0–1.2)
BUN SERPL-MCNC: 10 MG/DL (ref 8–23)
BUN/CREAT SERPL: 12.8 (ref 7–25)
CALCIUM SPEC-SCNC: 9.7 MG/DL (ref 8.6–10.5)
CHLORIDE SERPL-SCNC: 103 MMOL/L (ref 98–107)
CO2 SERPL-SCNC: 25 MMOL/L (ref 22–29)
CREAT SERPL-MCNC: 0.78 MG/DL (ref 0.57–1)
DEPRECATED RDW RBC AUTO: 39.8 FL (ref 37–54)
EGFRCR SERPLBLD CKD-EPI 2021: 78.8 ML/MIN/1.73
EOSINOPHIL # BLD AUTO: 0.07 10*3/MM3 (ref 0–0.4)
EOSINOPHIL NFR BLD AUTO: 0.9 % (ref 0.3–6.2)
ERYTHROCYTE [DISTWIDTH] IN BLOOD BY AUTOMATED COUNT: 11.8 % (ref 12.3–15.4)
FERRITIN SERPL-MCNC: 99.2 NG/ML (ref 13–150)
GLOBULIN UR ELPH-MCNC: 3.5 GM/DL
GLUCOSE SERPL-MCNC: 78 MG/DL (ref 65–99)
HAV IGM SERPL QL IA: NORMAL
HBV CORE IGM SERPL QL IA: NORMAL
HBV SURFACE AG SERPL QL IA: NORMAL
HCT VFR BLD AUTO: 41.3 % (ref 34–46.6)
HCV AB SER DONR QL: NORMAL
HGB BLD-MCNC: 14.1 G/DL (ref 12–15.9)
IMM GRANULOCYTES # BLD AUTO: 0.02 10*3/MM3 (ref 0–0.05)
IMM GRANULOCYTES NFR BLD AUTO: 0.2 % (ref 0–0.5)
IRON 24H UR-MRATE: 80 MCG/DL (ref 37–145)
IRON SATN MFR SERPL: 20 % (ref 20–50)
LYMPHOCYTES # BLD AUTO: 1.69 10*3/MM3 (ref 0.7–3.1)
LYMPHOCYTES NFR BLD AUTO: 20.8 % (ref 19.6–45.3)
MCH RBC QN AUTO: 31.4 PG (ref 26.6–33)
MCHC RBC AUTO-ENTMCNC: 34.1 G/DL (ref 31.5–35.7)
MCV RBC AUTO: 92 FL (ref 79–97)
MONOCYTES # BLD AUTO: 0.74 10*3/MM3 (ref 0.1–0.9)
MONOCYTES NFR BLD AUTO: 9.1 % (ref 5–12)
NEUTROPHILS NFR BLD AUTO: 5.55 10*3/MM3 (ref 1.7–7)
NEUTROPHILS NFR BLD AUTO: 68.4 % (ref 42.7–76)
NRBC BLD AUTO-RTO: 0 /100 WBC (ref 0–0.2)
PLATELET # BLD AUTO: 316 10*3/MM3 (ref 140–450)
PMV BLD AUTO: 11.2 FL (ref 6–12)
POTASSIUM SERPL-SCNC: 4.5 MMOL/L (ref 3.5–5.2)
PROT SERPL-MCNC: 8.1 G/DL (ref 6–8.5)
RBC # BLD AUTO: 4.49 10*6/MM3 (ref 3.77–5.28)
SODIUM SERPL-SCNC: 140 MMOL/L (ref 136–145)
TIBC SERPL-MCNC: 390 MCG/DL (ref 298–536)
TRANSFERRIN SERPL-MCNC: 262 MG/DL (ref 200–360)
WBC NRBC COR # BLD: 8.12 10*3/MM3 (ref 3.4–10.8)

## 2023-09-11 LAB
MITOCHONDRIA M2 IGG SER-ACNC: <20 UNITS (ref 0–20)
SMA IGG SER-ACNC: 16 UNITS (ref 0–19)

## 2023-09-12 LAB
A2 MACROGLOB SERPL-MCNC: 236 MG/DL (ref 110–276)
ALT SERPL W P-5'-P-CCNC: 19 IU/L (ref 0–40)
APO A-I SERPL-MCNC: 177 MG/DL (ref 116–209)
AST SERPL W P-5'-P-CCNC: 34 IU/L (ref 0–40)
BILIRUB SERPL-MCNC: 0.2 MG/DL (ref 0–1.2)
CHOLEST SERPL-MCNC: 219 MG/DL (ref 100–199)
FIBROSIS SCORING:: ABNORMAL
FIBROSIS STAGE SERPL QL: ABNORMAL
GGT SERPL-CCNC: 62 IU/L (ref 0–60)
GLUCOSE SERPL-MCNC: 79 MG/DL (ref 70–99)
HAPTOGLOB SERPL-MCNC: 216 MG/DL (ref 42–346)
LABORATORY COMMENT REPORT: ABNORMAL
LIVER FIBR SCORE SERPL CALC.FIBROSURE: 0.18 (ref 0–0.21)
LIVER STEATOSIS GRADE SERPL QL: ABNORMAL
LIVER STEATOSIS SCORE SERPL: 0.46 (ref 0–0.4)
LKM-1 AB SER-ACNC: 1.6 UNITS (ref 0–20)
NASH GRADE SERPL QL: ABNORMAL
NASH INTERPRETATION SERPL-IMP: ABNORMAL
NASH SCORE SERPL: 0.6 (ref 0–0.25)
NASH SCORING: ABNORMAL
STEATOSIS SCORING: ABNORMAL
TEST PERFORMANCE INFO SPEC: ABNORMAL
TEST PERFORMANCE INFO SPEC: ABNORMAL
TRIGL SERPL-MCNC: 119 MG/DL (ref 0–149)

## 2023-09-13 ENCOUNTER — TELEPHONE (OUTPATIENT)
Dept: GASTROENTEROLOGY | Facility: CLINIC | Age: 78
End: 2023-09-13
Payer: COMMERCIAL

## 2023-09-13 NOTE — TELEPHONE ENCOUNTER
Please let patient know that her labs are all back and everything looks good. Her body is compensating well for the cirrhosis. With the labs we were not able to determine a definitive cause. We'll repeat labs at her next appointment to follow.,

## 2023-09-15 ENCOUNTER — TELEPHONE (OUTPATIENT)
Dept: GASTROENTEROLOGY | Facility: CLINIC | Age: 78
End: 2023-09-15
Payer: COMMERCIAL

## 2023-09-15 NOTE — TELEPHONE ENCOUNTER
I spoke to patient, notified her that per Robert, We extensively discussed this while she was in clinic-there is no treatment for cirrhosis.  We only treat symptoms as they occur such as swelling or confusion/disorientation.  At this time, her labs are good and she does not require any treatment. Patient verbalized understanding.

## 2023-10-10 ENCOUNTER — TRANSCRIBE ORDERS (OUTPATIENT)
Dept: ADMINISTRATIVE | Facility: HOSPITAL | Age: 78
End: 2023-10-10
Payer: COMMERCIAL

## 2023-10-10 DIAGNOSIS — Z12.31 VISIT FOR SCREENING MAMMOGRAM: Primary | ICD-10-CM

## 2023-10-27 ENCOUNTER — HOSPITAL ENCOUNTER (OUTPATIENT)
Dept: MAMMOGRAPHY | Facility: HOSPITAL | Age: 78
Discharge: HOME | End: 2023-10-27
Admitting: FAMILY MEDICINE
Payer: MEDICARE

## 2023-10-27 DIAGNOSIS — Z12.31 VISIT FOR SCREENING MAMMOGRAM: ICD-10-CM

## 2023-10-27 PROCEDURE — 77063 BREAST TOMOSYNTHESIS BI: CPT | Performed by: RADIOLOGY

## 2023-10-27 PROCEDURE — 77063 BREAST TOMOSYNTHESIS BI: CPT

## 2023-10-27 PROCEDURE — 77067 SCR MAMMO BI INCL CAD: CPT

## 2023-10-27 PROCEDURE — 77067 SCR MAMMO BI INCL CAD: CPT | Performed by: RADIOLOGY

## 2023-12-04 ENCOUNTER — OFFICE VISIT (OUTPATIENT)
Dept: GASTROENTEROLOGY | Facility: CLINIC | Age: 78
End: 2023-12-04
Payer: MEDICARE

## 2023-12-04 VITALS
DIASTOLIC BLOOD PRESSURE: 57 MMHG | HEIGHT: 63 IN | SYSTOLIC BLOOD PRESSURE: 130 MMHG | BODY MASS INDEX: 18.43 KG/M2 | WEIGHT: 104 LBS | HEART RATE: 69 BPM

## 2023-12-04 DIAGNOSIS — K76.0 FATTY LIVER: ICD-10-CM

## 2023-12-04 DIAGNOSIS — R79.89 ELEVATED LFTS: Primary | ICD-10-CM

## 2023-12-04 DIAGNOSIS — R77.2 ELEVATED AFP: ICD-10-CM

## 2023-12-04 DIAGNOSIS — R93.2 ABNORMAL FINDINGS ON DIAGNOSTIC IMAGING OF LIVER AND BILIARY TRACT: ICD-10-CM

## 2023-12-04 PROCEDURE — 80053 COMPREHEN METABOLIC PANEL: CPT | Performed by: NURSE PRACTITIONER

## 2023-12-04 PROCEDURE — 36415 COLL VENOUS BLD VENIPUNCTURE: CPT | Performed by: NURSE PRACTITIONER

## 2023-12-04 PROCEDURE — 1159F MED LIST DOCD IN RCRD: CPT | Performed by: NURSE PRACTITIONER

## 2023-12-04 PROCEDURE — 99214 OFFICE O/P EST MOD 30 MIN: CPT | Performed by: NURSE PRACTITIONER

## 2023-12-04 PROCEDURE — 82105 ALPHA-FETOPROTEIN SERUM: CPT | Performed by: NURSE PRACTITIONER

## 2023-12-04 PROCEDURE — 85025 COMPLETE CBC W/AUTO DIFF WBC: CPT | Performed by: NURSE PRACTITIONER

## 2023-12-04 PROCEDURE — 1160F RVW MEDS BY RX/DR IN RCRD: CPT | Performed by: NURSE PRACTITIONER

## 2023-12-04 NOTE — PROGRESS NOTES
DATE:  12/4/2023    DIAGNOSIS:  Elevated Alk phos, Abnormal liver US, elevated AFP    CHIEF COMPLAINT:  Follow up of liver workup    Interval History:  Ms. Cho presents today for follow up, accompanied by her son. She was initially evaluated by Robert Storm PA-C in September 2023 for evaluation of elevated LFTs and abnormal liver US.  Please see previous note for full details. In brief, she underwent lab testing in July 2023 with PCP which revealed mildly elevated LFTs. She subsequently underwent abdominal US in July 2023 which showed heterogenous liver echotexture which is nonspecific but can be seen with underlying hepatocellular disease. She denies history of alcohol abuse. Denies excessive tylenol use. She denies having DM II or HLD. Her BMI is 18.42. Workup was obtained on 9/8/23. CMP showed mildly elevated Alk phos 121 with normal AST, ALT and total bilirubin. CBC showed normal counts. Acute hepatitis panel was negative. PT/INR were normal. Anti microsomal Ab, Anti smooth muscle Ab and Mitochondrial Ab were all normal. Iron panel and ferritin were normal. AFP was mildly elevated ~10.20. LUKE fibrosure showed mild steatosis with no fibrosis. At present, clinically she is doing well. She has no specific complaints today other than she is very concerned about having liver disease.     PAST MEDICAL HISTORY:  History reviewed. No pertinent past medical history.    PAST SURGICAL HISTORY:  No past surgical history on file.    SOCIAL HISTORY:  Social History     Socioeconomic History    Marital status:    Tobacco Use    Smoking status: Never       FAMILY HISTORY:  Family History   Problem Relation Age of Onset    Breast cancer Sister 47         MEDICATIONS:  The current medication list was reviewed in the EMR    Current Outpatient Medications:     ALPRAZolam (XANAX) 0.25 MG tablet, , Disp: , Rfl:     ondansetron ODT (ZOFRAN-ODT) 4 MG disintegrating tablet, Take 1 tablet by mouth 4 (Four) Times a Day.,  Disp: 15 tablet, Rfl: 0    pantoprazole (PROTONIX) 40 MG EC tablet, , Disp: , Rfl:     sertraline (ZOLOFT) 25 MG tablet, Take 1 tablet by mouth Daily., Disp: , Rfl:     ALLERGIES:  No Known Allergies      REVIEW OF SYSTEMS:    A comprehensive 14 point review of systems was performed.  Significant findings as mentioned above.  All other systems reviewed and are negative.        Physical Exam   Vital Signs:   Vitals:    23 1252   BP: 130/57   Pulse: 69    General: Well developed, well nourished, alert and oriented x 3, in no acute distress.   Head: ATNC   Eyes: PERRL, No evidence of conjunctivitis.   Nose: No nasal discharge.   Mouth: Oral mucosal membranes moist. No oral ulceration or hemorrhages.   Neck: Neck supple. No thyromegaly. No JVD.   Lungs: Clear in all fields to A&P without rales, rhonchi or wheezing.   Heart:  Regular rate and rhythm. No murmurs, rubs, or gallops.   Abdomen: Soft. Bowel sounds are normoactive. Nontender with palpation.   Extremities: No cyanosis or edema.   Neurologic: MS as above. Grossly non focal exam.      IMAGIN23  Narrative & Impression   EXAMINATION: US LIVER-      CLINICAL INDICATION:R74.01; R74.01-Elevation of levels of liver  transaminase levels     COMPARISON: None      TECHNIQUE: Sonographic imaging obtained in transverse and sagittal  planes of the liver. Color Doppler implemented.     FINDINGS:   Heterogeneous liver echotexture which can be seen with underlying  hepatocellular disease. No focal lesion.     No intrahepatic biliary dilatation noted.  Liver size appears within normal limits.  No perihepatic ascites identified.  Common bile duct is within normal limits and is:Millimeter.        IMPRESSION:  Heterogeneous liver echotexture which is nonspecific but can be seen  with underlying hepatocellular disease. Otherwise unremarkable exam.     This report was finalized on 2023 11:37 AM by Dr. Gino Stokes MD.          RECENT LABS:  Lab Results    Component Value Date    WBC 6.87 12/04/2023    HGB 13.8 12/04/2023    HCT 41.3 12/04/2023    MCV 92.4 12/04/2023    RDW 11.9 (L) 12/04/2023     12/04/2023    NEUTRORELPCT 62.7 12/04/2023    LYMPHORELPCT 26.2 12/04/2023    MONORELPCT 8.6 12/04/2023    EOSRELPCT 1.0 12/04/2023    BASORELPCT 1.2 12/04/2023    NEUTROABS 4.31 12/04/2023    LYMPHSABS 1.80 12/04/2023       Lab Results   Component Value Date     12/04/2023    K 4.6 12/04/2023    CO2 27.5 12/04/2023     12/04/2023    BUN 13 12/04/2023    CREATININE 0.85 12/04/2023    EGFRIFNONA 74 02/20/2019    GLUCOSE 89 12/04/2023    CALCIUM 9.8 12/04/2023    ALKPHOS 121 (H) 12/04/2023    AST 32 12/04/2023    ALT 28 12/04/2023    BILITOT 0.4 12/04/2023    ALBUMIN 4.4 12/04/2023    PROTEINTOT 7.6 12/04/2023          Component  Ref Range & Units 1 d ago 2 mo ago   ALPHA-FETOPROTEIN  0 - 8.3 ng/mL 10.90 High  10.20 High         ASSESSMENT & PLAN:  Nila Cho is a very pleasant 78 y.o. female with    1.  Mild fatty liver:  2. Mildly elevated Alk phos:  3. Elevated AFP with abnormal US liver:    -Abdominal US in July 2023 showed heterogenous liver echotexture which is nonspecific but can be seen with underlying hepatocellular disease. She denies history of alcohol abuse. Denies excessive tylenol use. She denies having DM II or HLD. Her BMI is 18.42.  - Workup was obtained on 9/8/23. CMP showed mildly elevated Alk phos 121 with normal AST, ALT and total bilirubin. CBC showed normal counts. Acute hepatitis panel was negative. PT/INR were normal. Anti microsomal Ab, Anti smooth muscle Ab and Mitochondrial Ab were all normal. Iron panel and ferritin were normal. AFP was mildly elevated ~10.20. LUKE fibrosure showed mild steatosis with no fibrosis. We discussed the above workup today.   -Obtained repeat CMP which again showed mildly elevated alk phos 121 with normal AST, ALT and total bili. CBC showed normal counts. AFP remained elevated 10.90. Discussed the  above workup with Dr. Noriega and will obtain CT Abdomen with contrast and liver protocol to further evaluate. Based on findings, will discuss further management.       Electronically Signed by: ELVER Avina ,  December 4, 2023 13:05 EST       CC:   Riley Mohamud MD

## 2023-12-05 LAB
ALBUMIN SERPL-MCNC: 4.4 G/DL (ref 3.5–5.2)
ALBUMIN/GLOB SERPL: 1.4 G/DL
ALP SERPL-CCNC: 121 U/L (ref 39–117)
ALPHA-FETOPROTEIN: 10.9 NG/ML (ref 0–8.3)
ALT SERPL W P-5'-P-CCNC: 28 U/L (ref 1–33)
ANION GAP SERPL CALCULATED.3IONS-SCNC: 10.5 MMOL/L (ref 5–15)
AST SERPL-CCNC: 32 U/L (ref 1–32)
BASOPHILS # BLD AUTO: 0.08 10*3/MM3 (ref 0–0.2)
BASOPHILS NFR BLD AUTO: 1.2 % (ref 0–1.5)
BILIRUB SERPL-MCNC: 0.4 MG/DL (ref 0–1.2)
BUN SERPL-MCNC: 13 MG/DL (ref 8–23)
BUN/CREAT SERPL: 15.3 (ref 7–25)
CALCIUM SPEC-SCNC: 9.8 MG/DL (ref 8.6–10.5)
CHLORIDE SERPL-SCNC: 103 MMOL/L (ref 98–107)
CO2 SERPL-SCNC: 27.5 MMOL/L (ref 22–29)
CREAT SERPL-MCNC: 0.85 MG/DL (ref 0.57–1)
DEPRECATED RDW RBC AUTO: 40.3 FL (ref 37–54)
EGFRCR SERPLBLD CKD-EPI 2021: 71.1 ML/MIN/1.73
EOSINOPHIL # BLD AUTO: 0.07 10*3/MM3 (ref 0–0.4)
EOSINOPHIL NFR BLD AUTO: 1 % (ref 0.3–6.2)
ERYTHROCYTE [DISTWIDTH] IN BLOOD BY AUTOMATED COUNT: 11.9 % (ref 12.3–15.4)
GLOBULIN UR ELPH-MCNC: 3.2 GM/DL
GLUCOSE SERPL-MCNC: 89 MG/DL (ref 65–99)
HCT VFR BLD AUTO: 41.3 % (ref 34–46.6)
HGB BLD-MCNC: 13.8 G/DL (ref 12–15.9)
IMM GRANULOCYTES # BLD AUTO: 0.02 10*3/MM3 (ref 0–0.05)
IMM GRANULOCYTES NFR BLD AUTO: 0.3 % (ref 0–0.5)
LYMPHOCYTES # BLD AUTO: 1.8 10*3/MM3 (ref 0.7–3.1)
LYMPHOCYTES NFR BLD AUTO: 26.2 % (ref 19.6–45.3)
MCH RBC QN AUTO: 30.9 PG (ref 26.6–33)
MCHC RBC AUTO-ENTMCNC: 33.4 G/DL (ref 31.5–35.7)
MCV RBC AUTO: 92.4 FL (ref 79–97)
MONOCYTES # BLD AUTO: 0.59 10*3/MM3 (ref 0.1–0.9)
MONOCYTES NFR BLD AUTO: 8.6 % (ref 5–12)
NEUTROPHILS NFR BLD AUTO: 4.31 10*3/MM3 (ref 1.7–7)
NEUTROPHILS NFR BLD AUTO: 62.7 % (ref 42.7–76)
NRBC BLD AUTO-RTO: 0 /100 WBC (ref 0–0.2)
PLATELET # BLD AUTO: 287 10*3/MM3 (ref 140–450)
PMV BLD AUTO: 11.4 FL (ref 6–12)
POTASSIUM SERPL-SCNC: 4.6 MMOL/L (ref 3.5–5.2)
PROT SERPL-MCNC: 7.6 G/DL (ref 6–8.5)
RBC # BLD AUTO: 4.47 10*6/MM3 (ref 3.77–5.28)
SODIUM SERPL-SCNC: 141 MMOL/L (ref 136–145)
WBC NRBC COR # BLD AUTO: 6.87 10*3/MM3 (ref 3.4–10.8)

## 2024-01-03 ENCOUNTER — HOSPITAL ENCOUNTER (OUTPATIENT)
Dept: CT IMAGING | Facility: HOSPITAL | Age: 79
Discharge: HOME | End: 2024-01-03
Admitting: NURSE PRACTITIONER
Payer: MEDICARE

## 2024-01-03 DIAGNOSIS — R93.2 ABNORMAL FINDINGS ON DIAGNOSTIC IMAGING OF LIVER AND BILIARY TRACT: ICD-10-CM

## 2024-01-03 DIAGNOSIS — R79.89 ELEVATED LFTS: ICD-10-CM

## 2024-01-03 DIAGNOSIS — R77.2 ELEVATED AFP: ICD-10-CM

## 2024-01-03 PROCEDURE — 25510000001 IOPAMIDOL 61 % SOLUTION: Performed by: NURSE PRACTITIONER

## 2024-01-03 PROCEDURE — 74170 CT ABD WO CNTRST FLWD CNTRST: CPT

## 2024-01-03 RX ADMIN — IOPAMIDOL 75 ML: 612 INJECTION, SOLUTION INTRAVENOUS at 09:48

## 2024-01-04 ENCOUNTER — TELEPHONE (OUTPATIENT)
Dept: GASTROENTEROLOGY | Facility: CLINIC | Age: 79
End: 2024-01-04
Payer: COMMERCIAL

## 2024-01-04 DIAGNOSIS — R93.2 ABNORMAL FINDINGS ON DIAGNOSTIC IMAGING OF LIVER AND BILIARY TRACT: ICD-10-CM

## 2024-01-04 DIAGNOSIS — R77.2 ELEVATED AFP: Primary | ICD-10-CM

## 2024-01-04 DIAGNOSIS — R79.89 ELEVATED LIVER FUNCTION TESTS: ICD-10-CM

## 2024-01-04 NOTE — TELEPHONE ENCOUNTER
Reviewed recent workup including lab testing from 9/8 and 12/4 and recent CT A/P from 1/3/24 with Dr. Noriega. It was recommended to refer patient to [facility] hepatology for second opinion and further management. I discussed workup and recommendations in detail with patient and son. They are agreeable to referral to [facility] hepatology. Referral placed. In regards to constipation, recommended she start Miralax 1 capful daily.

## 2024-01-04 NOTE — TELEPHONE ENCOUNTER
CLINICAL NUTRITION SERVICES - BRIEF NOTE    Received provider consult for nutrition education with comments post op cardiovascular surgery (automatic consult on post-op order set). S/p MVR, CABGx1, Lopez 4 Maze Atrial Appendidge Clip on 8/23. Nutrition education to be completed as able/appropriate (as pt s/p CABG and/or initial VAD).    RD will follow per LOS protocol or if re-consulted.     Hansa White MS, RD, LD  4A (CVICU) RD pager: [phone]  Ascom: [id number]  Weekend/Holiday RD pager: [phone]   Per jean Dumont referral to [facility] hepatology. Patient and son are aware and agreeable.

## 2024-01-05 NOTE — TELEPHONE ENCOUNTER
I called [facility]at[facility] sherif @ [phone] spoke to  gave her referral information for patient. I faxed requested records to [phone]. Their office will contact patient with an Appt.

## 2024-01-25 ENCOUNTER — HOSPITAL ENCOUNTER (EMERGENCY)
Facility: HOSPITAL | Age: 79
Discharge: HOME | End: 2024-01-25
Attending: STUDENT IN AN ORGANIZED HEALTH CARE EDUCATION/TRAINING PROGRAM
Payer: MEDICARE

## 2024-01-25 ENCOUNTER — APPOINTMENT (OUTPATIENT)
Dept: GENERAL RADIOLOGY | Facility: HOSPITAL | Age: 79
End: 2024-01-25
Payer: MEDICARE

## 2024-01-25 VITALS
HEIGHT: 63 IN | SYSTOLIC BLOOD PRESSURE: 133 MMHG | WEIGHT: 103 LBS | BODY MASS INDEX: 18.25 KG/M2 | HEART RATE: 65 BPM | TEMPERATURE: 98 F | RESPIRATION RATE: 18 BRPM | OXYGEN SATURATION: 100 % | DIASTOLIC BLOOD PRESSURE: 61 MMHG

## 2024-01-25 DIAGNOSIS — N39.0 URINARY TRACT INFECTION WITHOUT HEMATURIA, SITE UNSPECIFIED: Primary | ICD-10-CM

## 2024-01-25 DIAGNOSIS — R11.0 NAUSEA: ICD-10-CM

## 2024-01-25 LAB
ALBUMIN SERPL-MCNC: 3.8 G/DL (ref 3.5–5.2)
ALBUMIN/GLOB SERPL: 1.1 G/DL
ALP SERPL-CCNC: 134 U/L (ref 39–117)
ALT SERPL W P-5'-P-CCNC: 18 U/L (ref 1–33)
ANION GAP SERPL CALCULATED.3IONS-SCNC: 10.7 MMOL/L (ref 5–15)
AST SERPL-CCNC: 22 U/L (ref 1–32)
BACTERIA UR QL AUTO: ABNORMAL /HPF
BASOPHILS # BLD AUTO: 0.06 10*3/MM3 (ref 0–0.2)
BASOPHILS NFR BLD AUTO: 0.7 % (ref 0–1.5)
BILIRUB SERPL-MCNC: 0.4 MG/DL (ref 0–1.2)
BILIRUB UR QL STRIP: NEGATIVE
BUN SERPL-MCNC: 9 MG/DL (ref 8–23)
BUN/CREAT SERPL: 9.9 (ref 7–25)
CALCIUM SPEC-SCNC: 9.3 MG/DL (ref 8.6–10.5)
CHLORIDE SERPL-SCNC: 100 MMOL/L (ref 98–107)
CLARITY UR: CLEAR
CO2 SERPL-SCNC: 26.3 MMOL/L (ref 22–29)
COLOR UR: YELLOW
CREAT SERPL-MCNC: 0.91 MG/DL (ref 0.57–1)
CRP SERPL-MCNC: 0.44 MG/DL (ref 0–0.5)
DEPRECATED RDW RBC AUTO: 45.5 FL (ref 37–54)
EGFRCR SERPLBLD CKD-EPI 2021: 65.5 ML/MIN/1.73
EOSINOPHIL # BLD AUTO: 0.07 10*3/MM3 (ref 0–0.4)
EOSINOPHIL NFR BLD AUTO: 0.8 % (ref 0.3–6.2)
ERYTHROCYTE [DISTWIDTH] IN BLOOD BY AUTOMATED COUNT: 12.6 % (ref 12.3–15.4)
ERYTHROCYTE [SEDIMENTATION RATE] IN BLOOD: 23 MM/HR (ref 0–30)
GLOBULIN UR ELPH-MCNC: 3.5 GM/DL
GLUCOSE SERPL-MCNC: 123 MG/DL (ref 65–99)
GLUCOSE UR STRIP-MCNC: NEGATIVE MG/DL
HCT VFR BLD AUTO: 41.4 % (ref 34–46.6)
HGB BLD-MCNC: 13 G/DL (ref 12–15.9)
HGB UR QL STRIP.AUTO: ABNORMAL
HOLD SPECIMEN: NORMAL
HOLD SPECIMEN: NORMAL
HYALINE CASTS UR QL AUTO: ABNORMAL /LPF
IMM GRANULOCYTES # BLD AUTO: 0.02 10*3/MM3 (ref 0–0.05)
IMM GRANULOCYTES NFR BLD AUTO: 0.2 % (ref 0–0.5)
KETONES UR QL STRIP: NEGATIVE
LEUKOCYTE ESTERASE UR QL STRIP.AUTO: ABNORMAL
LIPASE SERPL-CCNC: 29 U/L (ref 13–60)
LYMPHOCYTES # BLD AUTO: 1.8 10*3/MM3 (ref 0.7–3.1)
LYMPHOCYTES NFR BLD AUTO: 21 % (ref 19.6–45.3)
MAGNESIUM SERPL-MCNC: 1.9 MG/DL (ref 1.6–2.4)
MCH RBC QN AUTO: 30.8 PG (ref 26.6–33)
MCHC RBC AUTO-ENTMCNC: 31.4 G/DL (ref 31.5–35.7)
MCV RBC AUTO: 98.1 FL (ref 79–97)
MONOCYTES # BLD AUTO: 1.31 10*3/MM3 (ref 0.1–0.9)
MONOCYTES NFR BLD AUTO: 15.3 % (ref 5–12)
NEUTROPHILS NFR BLD AUTO: 5.3 10*3/MM3 (ref 1.7–7)
NEUTROPHILS NFR BLD AUTO: 62 % (ref 42.7–76)
NITRITE UR QL STRIP: POSITIVE
NRBC BLD AUTO-RTO: 0 /100 WBC (ref 0–0.2)
PH UR STRIP.AUTO: 8 [PH] (ref 5–8)
PLATELET # BLD AUTO: 236 10*3/MM3 (ref 140–450)
PMV BLD AUTO: 10.1 FL (ref 6–12)
POTASSIUM SERPL-SCNC: 4.1 MMOL/L (ref 3.5–5.2)
PROT SERPL-MCNC: 7.3 G/DL (ref 6–8.5)
PROT UR QL STRIP: NEGATIVE
QT INTERVAL: 436 MS
QTC INTERVAL: 438 MS
RBC # BLD AUTO: 4.22 10*6/MM3 (ref 3.77–5.28)
RBC # UR STRIP: ABNORMAL /HPF
REF LAB TEST METHOD: ABNORMAL
SODIUM SERPL-SCNC: 137 MMOL/L (ref 136–145)
SP GR UR STRIP: 1.01 (ref 1–1.03)
SQUAMOUS #/AREA URNS HPF: ABNORMAL /HPF
TROPONIN T SERPL HS-MCNC: <6 NG/L
TSH SERPL DL<=0.05 MIU/L-ACNC: 5.24 UIU/ML (ref 0.27–4.2)
UROBILINOGEN UR QL STRIP: ABNORMAL
WBC # UR STRIP: ABNORMAL /HPF
WBC NRBC COR # BLD AUTO: 8.56 10*3/MM3 (ref 3.4–10.8)
WHOLE BLOOD HOLD COAG: NORMAL
WHOLE BLOOD HOLD SPECIMEN: NORMAL

## 2024-01-25 PROCEDURE — 85652 RBC SED RATE AUTOMATED: CPT | Performed by: PHYSICIAN ASSISTANT

## 2024-01-25 PROCEDURE — 84484 ASSAY OF TROPONIN QUANT: CPT | Performed by: PHYSICIAN ASSISTANT

## 2024-01-25 PROCEDURE — 93010 ELECTROCARDIOGRAM REPORT: CPT | Performed by: INTERNAL MEDICINE

## 2024-01-25 PROCEDURE — 83690 ASSAY OF LIPASE: CPT | Performed by: PHYSICIAN ASSISTANT

## 2024-01-25 PROCEDURE — 83735 ASSAY OF MAGNESIUM: CPT | Performed by: PHYSICIAN ASSISTANT

## 2024-01-25 PROCEDURE — 99284 EMERGENCY DEPT VISIT MOD MDM: CPT

## 2024-01-25 PROCEDURE — 93005 ELECTROCARDIOGRAM TRACING: CPT | Performed by: PHYSICIAN ASSISTANT

## 2024-01-25 PROCEDURE — 87076 CULTURE ANAEROBE IDENT EACH: CPT | Performed by: PHYSICIAN ASSISTANT

## 2024-01-25 PROCEDURE — 71045 X-RAY EXAM CHEST 1 VIEW: CPT | Performed by: RADIOLOGY

## 2024-01-25 PROCEDURE — 87086 URINE CULTURE/COLONY COUNT: CPT | Performed by: PHYSICIAN ASSISTANT

## 2024-01-25 PROCEDURE — 85025 COMPLETE CBC W/AUTO DIFF WBC: CPT | Performed by: PHYSICIAN ASSISTANT

## 2024-01-25 PROCEDURE — 81001 URINALYSIS AUTO W/SCOPE: CPT | Performed by: PHYSICIAN ASSISTANT

## 2024-01-25 PROCEDURE — 86140 C-REACTIVE PROTEIN: CPT | Performed by: PHYSICIAN ASSISTANT

## 2024-01-25 PROCEDURE — 80053 COMPREHEN METABOLIC PANEL: CPT | Performed by: PHYSICIAN ASSISTANT

## 2024-01-25 PROCEDURE — 84443 ASSAY THYROID STIM HORMONE: CPT | Performed by: PHYSICIAN ASSISTANT

## 2024-01-25 PROCEDURE — 71045 X-RAY EXAM CHEST 1 VIEW: CPT

## 2024-01-25 RX ORDER — ONDANSETRON 4 MG/1
4 TABLET, ORALLY DISINTEGRATING ORAL 4 TIMES DAILY
Qty: 15 TABLET | Refills: 0 | Status: SHIPPED | OUTPATIENT
Start: 2024-01-25

## 2024-01-25 RX ORDER — CEFDINIR 300 MG/1
300 CAPSULE ORAL 2 TIMES DAILY
Qty: 14 CAPSULE | Refills: 0 | Status: SHIPPED | OUTPATIENT
Start: 2024-01-25

## 2024-01-25 NOTE — ED PROVIDER NOTES
Subjective   History of Present Illness  78-year-old female presents secondary to nausea and an episode of weakness.  Family is uncertain whether she actually lost consciousness but however the patient felt like she could have passed out.  She states that she does this when she gets nauseated.  She denies any chest pain pressure tightness wheezing.  No palpitations.  No tachycardia.  No fever.  She states this occurred just prior to arrival.  At this time she feels fine after receiving a Zofran ODT.  She has a past medical history of elevated LFTs.  She voices no other complaints this time.  No abdominal pain.  No burning with urination urinary frequency or urgency.  She presents by private vehicle.        Review of Systems   Constitutional: Negative.  Negative for fever.   HENT: Negative.     Respiratory: Negative.     Cardiovascular: Negative.  Negative for chest pain.   Gastrointestinal:  Positive for nausea. Negative for abdominal pain, diarrhea and vomiting.   Endocrine: Negative.    Genitourinary: Negative.  Negative for dysuria.   Skin: Negative.    Neurological: Negative.    Psychiatric/Behavioral: Negative.     All other systems reviewed and are negative.      No past medical history on file.    No Known Allergies    No past surgical history on file.    Family History   Problem Relation Age of Onset    Breast cancer Sister 47       Social History     Socioeconomic History    Marital status:    Tobacco Use    Smoking status: Never           Objective   Physical Exam  Vitals and nursing note reviewed.   Constitutional:       General: She is not in acute distress.     Appearance: She is well-developed. She is not diaphoretic.   HENT:      Head: Normocephalic and atraumatic.      Right Ear: External ear normal.      Left Ear: External ear normal.      Nose: Nose normal.   Eyes:      Conjunctiva/sclera: Conjunctivae normal.      Pupils: Pupils are equal, round, and reactive to light.   Neck:       Vascular: No JVD.      Trachea: No tracheal deviation.   Cardiovascular:      Rate and Rhythm: Normal rate and regular rhythm.      Heart sounds: Normal heart sounds. No murmur heard.  Pulmonary:      Effort: Pulmonary effort is normal. No respiratory distress.      Breath sounds: Normal breath sounds. No wheezing.   Abdominal:      General: Bowel sounds are normal.      Palpations: Abdomen is soft.      Tenderness: There is no abdominal tenderness.   Musculoskeletal:         General: No deformity. Normal range of motion.      Cervical back: Normal range of motion and neck supple.   Skin:     General: Skin is warm and dry.      Coloration: Skin is not pale.      Findings: No erythema or rash.   Neurological:      Mental Status: She is alert and oriented to person, place, and time.      Cranial Nerves: No cranial nerve deficit.   Psychiatric:         Behavior: Behavior normal.         Thought Content: Thought content normal.         Procedures           ED Course  ED Course as of 01/25/24 1806   Thu Jan 25, 2024   0937 EKG notes sinus rhythm.  61 bpm.  No acute ST elevation.  QTc 438.  Electronically signed by Dav Ferrell DO, 01/25/24, 9:38 AM EST.   [SF]   1025 Patient was offered admission due to her near syncope/questionable syncope.  She states that she does this when she gets nauseated.  She has done this in the past several times.  She states that she feels fine at this time.  She declines admission.  She prefers to go home.  She was counseled better urine results.  Will get an culture of this.  She was counseled at the signs and symptoms worsening on appropriate follow-up along with need for follow-up with primary care given her presyncope.  Voices understanding.  She is to return with any worsening symptoms. [JI]      ED Course User Index  [JI] Tyron Banda PA  [SF] Dav Ferrell DO                                   Results for orders placed or performed during the hospital encounter of 01/25/24    Comprehensive Metabolic Panel    Specimen: Blood   Result Value Ref Range    Glucose 123 (H) 65 - 99 mg/dL    BUN 9 8 - 23 mg/dL    Creatinine 0.91 0.57 - 1.00 mg/dL    Sodium 137 136 - 145 mmol/L    Potassium 4.1 3.5 - 5.2 mmol/L    Chloride 100 98 - 107 mmol/L    CO2 26.3 22.0 - 29.0 mmol/L    Calcium 9.3 8.6 - 10.5 mg/dL    Total Protein 7.3 6.0 - 8.5 g/dL    Albumin 3.8 3.5 - 5.2 g/dL    ALT (SGPT) 18 1 - 33 U/L    AST (SGOT) 22 1 - 32 U/L    Alkaline Phosphatase 134 (H) 39 - 117 U/L    Total Bilirubin 0.4 0.0 - 1.2 mg/dL    Globulin 3.5 gm/dL    A/G Ratio 1.1 g/dL    BUN/Creatinine Ratio 9.9 7.0 - 25.0    Anion Gap 10.7 5.0 - 15.0 mmol/L    eGFR 65.5 >60.0 mL/min/1.73   Urinalysis With Culture If Indicated - Urine, Clean Catch    Specimen: Urine, Clean Catch   Result Value Ref Range    Color, UA Yellow Yellow, Straw    Appearance, UA Clear Clear    pH, UA 8.0 5.0 - 8.0    Specific Gravity, UA 1.009 1.005 - 1.030    Glucose, UA Negative Negative    Ketones, UA Negative Negative    Bilirubin, UA Negative Negative    Blood, UA Small (1+) (A) Negative    Protein, UA Negative Negative    Leuk Esterase, UA Small (1+) (A) Negative    Nitrite, UA Positive (A) Negative    Urobilinogen, UA 0.2 E.U./dL 0.2 - 1.0 E.U./dL   Lipase    Specimen: Blood   Result Value Ref Range    Lipase 29 13 - 60 U/L   Single High Sensitivity Troponin T    Specimen: Blood   Result Value Ref Range    HS Troponin T <6 <14 ng/L   C-reactive Protein    Specimen: Blood   Result Value Ref Range    C-Reactive Protein 0.44 0.00 - 0.50 mg/dL   Sedimentation Rate    Specimen: Blood   Result Value Ref Range    Sed Rate 23 0 - 30 mm/hr   TSH    Specimen: Blood   Result Value Ref Range    TSH 5.240 (H) 0.270 - 4.200 uIU/mL   Magnesium    Specimen: Blood   Result Value Ref Range    Magnesium 1.9 1.6 - 2.4 mg/dL   CBC Auto Differential    Specimen: Blood   Result Value Ref Range    WBC 8.56 3.40 - 10.80 10*3/mm3    RBC 4.22 3.77 - 5.28 10*6/mm3     Hemoglobin 13.0 12.0 - 15.9 g/dL    Hematocrit 41.4 34.0 - 46.6 %    MCV 98.1 (H) 79.0 - 97.0 fL    MCH 30.8 26.6 - 33.0 pg    MCHC 31.4 (L) 31.5 - 35.7 g/dL    RDW 12.6 12.3 - 15.4 %    RDW-SD 45.5 37.0 - 54.0 fl    MPV 10.1 6.0 - 12.0 fL    Platelets 236 140 - 450 10*3/mm3    Neutrophil % 62.0 42.7 - 76.0 %    Lymphocyte % 21.0 19.6 - 45.3 %    Monocyte % 15.3 (H) 5.0 - 12.0 %    Eosinophil % 0.8 0.3 - 6.2 %    Basophil % 0.7 0.0 - 1.5 %    Immature Grans % 0.2 0.0 - 0.5 %    Neutrophils, Absolute 5.30 1.70 - 7.00 10*3/mm3    Lymphocytes, Absolute 1.80 0.70 - 3.10 10*3/mm3    Monocytes, Absolute 1.31 (H) 0.10 - 0.90 10*3/mm3    Eosinophils, Absolute 0.07 0.00 - 0.40 10*3/mm3    Basophils, Absolute 0.06 0.00 - 0.20 10*3/mm3    Immature Grans, Absolute 0.02 0.00 - 0.05 10*3/mm3    nRBC 0.0 0.0 - 0.2 /100 WBC   Urinalysis, Microscopic Only - Urine, Clean Catch    Specimen: Urine, Clean Catch   Result Value Ref Range    RBC, UA 6-10 (A) None Seen, 0-2 /HPF    WBC, UA Too Numerous to Count (A) None Seen, 0-2 /HPF    Bacteria, UA 4+ (A) None Seen /HPF    Squamous Epithelial Cells, UA None Seen None Seen, 0-2 /HPF    Hyaline Casts, UA 3-6 None Seen /LPF    Methodology Automated Microscopy    ECG 12 Lead Other; nausea, weakness   Result Value Ref Range    QT Interval 436 ms    QTC Interval 438 ms   Green Top (Gel)   Result Value Ref Range    Extra Tube Hold for add-ons.    Lavender Top   Result Value Ref Range    Extra Tube hold for add-on    Gold Top - SST   Result Value Ref Range    Extra Tube Hold for add-ons.    Light Blue Top   Result Value Ref Range    Extra Tube Hold for add-ons.      XR Chest 1 View    Result Date: 1/25/2024  No acute cardiopulmonary process.   This report was finalized on 1/25/2024 9:42 AM by Laron Bates M.D..               Medical Decision Making  78-year-old female presents secondary to nausea and an episode of weakness.  Family is uncertain whether she actually lost consciousness but  however the patient felt like she could have passed out.  She states that she does this when she gets nauseated.  She denies any chest pain pressure tightness wheezing.  No palpitations.  No tachycardia.  No fever.  She states this occurred just prior to arrival.  At this time she feels fine after receiving a Zofran ODT.  She has a past medical history of elevated LFTs.  She voices no other complaints this time.  No abdominal pain.  No burning with urination urinary frequency or urgency.  She presents by private vehicle.    Problems Addressed:  Nausea: complicated acute illness or injury  Urinary tract infection without hematuria, site unspecified: complicated acute illness or injury    Amount and/or Complexity of Data Reviewed  Labs: ordered. Decision-making details documented in ED Course.  Radiology: ordered. Decision-making details documented in ED Course.  ECG/medicine tests: ordered.    Risk  Prescription drug management.  Risk Details: Patient was given the option of admission to the hospital for her near syncope/syncopal episode however she refused/declined.  I did not make her sign out against medical advice.  Her ER course was uneventful.  She is hemodynamically stable.  She was found to have a urinary tract infection.  She was placed on antibiotics.  She was counseled at the signs and symptoms worsening on appropriate follow-up.  She was counseled by diagnostic workup and labs.  She voiced understanding        Final diagnoses:   Urinary tract infection without hematuria, site unspecified   Nausea       ED Disposition  ED Disposition       ED Disposition   Discharge    Condition   Stable    Comment   --               Riley Rodriguez MD  [address] [place] [id number]  [phone]    Schedule an appointment as soon as possible for a visit       [facility] EMERGENCY DEPARTMENT  [address] [place] [id number] [phone]    If symptoms worsen         Medication  List        New Prescriptions      cefdinir 300 MG capsule  Commonly known as: OMNICEF  Take 1 capsule by mouth 2 (Two) Times a Day.            Changed      ondansetron ODT 4 MG disintegrating tablet  Commonly known as: ZOFRAN-ODT  Place 1 tablet on the tongue 4 (Four) Times a Day.  What changed: how to take this               Where to Get Your Medications        You can get these medications from any pharmacy    Bring a paper prescription for each of these medications  cefdinir 300 MG capsule  ondansetron ODT 4 MG disintegrating tablet            Tyron Banda PA  01/25/24 180

## 2024-01-26 LAB — BACTERIA SPEC AEROBE CULT: ABNORMAL

## 2024-02-06 ENCOUNTER — HOSPITAL ENCOUNTER (OUTPATIENT)
Dept: ULTRASOUND IMAGING | Facility: HOSPITAL | Age: 79
Discharge: HOME | End: 2024-02-06
Admitting: NURSE PRACTITIONER
Payer: MEDICARE

## 2024-02-06 ENCOUNTER — TELEPHONE (OUTPATIENT)
Dept: GASTROENTEROLOGY | Facility: CLINIC | Age: 79
End: 2024-02-06
Payer: COMMERCIAL

## 2024-02-06 DIAGNOSIS — R79.89 ELEVATED LFTS: ICD-10-CM

## 2024-02-06 PROCEDURE — 76705 ECHO EXAM OF ABDOMEN: CPT

## 2024-02-06 PROCEDURE — 76705 ECHO EXAM OF ABDOMEN: CPT | Performed by: RADIOLOGY

## 2024-02-06 NOTE — TELEPHONE ENCOUNTER
Called and discussed with Ms. Cho's son, US liver from today is now reporting coarse echotexture of the liver suggestive of cirrhosis which was not previously mentioned on CT Abdomen with liver protocol from 1/3/24. Her elevated AFP level is mildly elevated and likely secondary to cirrhosis. She was previously referred to hepatology at [facility] for second opinion and has upcoming appointment in March. Will plan to follow up as scheduled.

## 2024-03-22 ENCOUNTER — TRANSCRIBE ORDERS (OUTPATIENT)
Dept: ADMINISTRATIVE | Facility: HOSPITAL | Age: 79
End: 2024-03-22
Payer: COMMERCIAL

## 2024-03-22 DIAGNOSIS — Z13.820 ENCOUNTER FOR OSTEOPOROSIS SCREENING IN ASYMPTOMATIC POSTMENOPAUSAL PATIENT: Primary | ICD-10-CM

## 2024-03-22 DIAGNOSIS — Z78.0 ENCOUNTER FOR OSTEOPOROSIS SCREENING IN ASYMPTOMATIC POSTMENOPAUSAL PATIENT: Primary | ICD-10-CM

## 2024-04-04 ENCOUNTER — TELEPHONE (OUTPATIENT)
Dept: GASTROENTEROLOGY | Facility: CLINIC | Age: 79
End: 2024-04-04
Payer: COMMERCIAL

## 2024-04-05 ENCOUNTER — HOSPITAL ENCOUNTER (OUTPATIENT)
Dept: BONE DENSITY | Facility: HOSPITAL | Age: 79
Discharge: HOME | End: 2024-04-05
Payer: MEDICARE

## 2024-04-05 ENCOUNTER — TELEPHONE (OUTPATIENT)
Dept: GASTROENTEROLOGY | Facility: CLINIC | Age: 79
End: 2024-04-05
Payer: COMMERCIAL

## 2024-04-05 DIAGNOSIS — Z78.0 ENCOUNTER FOR OSTEOPOROSIS SCREENING IN ASYMPTOMATIC POSTMENOPAUSAL PATIENT: ICD-10-CM

## 2024-04-05 DIAGNOSIS — Z13.820 ENCOUNTER FOR OSTEOPOROSIS SCREENING IN ASYMPTOMATIC POSTMENOPAUSAL PATIENT: ICD-10-CM

## 2024-04-05 PROCEDURE — 77080 DXA BONE DENSITY AXIAL: CPT

## 2024-04-09 DIAGNOSIS — K21.9 GASTROESOPHAGEAL REFLUX DISEASE, UNSPECIFIED WHETHER ESOPHAGITIS PRESENT: Primary | ICD-10-CM

## 2024-04-09 RX ORDER — PANTOPRAZOLE SODIUM 40 MG/1
40 TABLET, DELAYED RELEASE ORAL DAILY
Qty: 30 TABLET | Refills: 5 | Status: SHIPPED | OUTPATIENT
Start: 2024-04-09

## 2024-06-04 NOTE — PROGRESS NOTES
DATE:  6/5/2024    DIAGNOSIS:  Elevated Alk phos, Abnormal liver US, elevated AFP    CHIEF COMPLAINT:  Follow up of liver workup    HPI:  Ms. Cho presents today for follow up, accompanied by her son. She was initially evaluated by Robert Storm PA-C in September 2023 for evaluation of elevated LFTs and abnormal liver US.  Please see previous note for full details. In brief, she underwent lab testing in July 2023 with PCP which revealed mildly elevated LFTs. She subsequently underwent abdominal US in July 2023 which showed heterogenous liver echotexture which is nonspecific but can be seen with underlying hepatocellular disease. She denies history of alcohol abuse. Denies excessive tylenol use. She denies having DM II or HLD. Her BMI is 18.42. Workup was obtained on 9/8/23. CMP showed mildly elevated Alk phos 121 with normal AST, ALT and total bilirubin. CBC showed normal counts. Acute hepatitis panel was negative. PT/INR were normal. Anti microsomal Ab, Anti smooth muscle Ab and Mitochondrial Ab were all normal. Iron panel and ferritin were normal. AFP was mildly elevated ~10.20. LUKE fibrosure showed mild steatosis with no fibrosis. At present, clinically she is doing well. She has no specific complaints today other than she is very concerned about having liver disease.     INTERVAL HISTORY:  Ms. Cho presents today for follow up.  At her last visit, CT abdomen with and without contrast was obtained on 1/3/2024 which revealed a low-attenuation lesion in the liver most compatible with hemangioma.  In February 2024, ultrasound of the liver which was previously ordered was also obtained and reported coarse echotexture of the liver suggestive of cirrhosis which was not mentioned on previous CT imaging in January. After discussion of imaging findings as well as previous liver workup with Dr. Noriega, it was recommended to refer patient to [facility] hepatology for second opinion. She was evaluated by Dr. Merino who  felt elevated Alk phos was not related to liver origin and recommended DEXA be done with PCP to evaluate bone health.  Dexa was done on 4/5/2024 and revealed osteoporosis which is being managed by her PCP. Repeat AFP remained elevated on 3/20/2024, 10.9.  Therefore,  plan is to obtain MRI liver with follow up in September.  At present, clinically she is doing well.  She reports having GERD which is controlled with Protonix 40 mg p.o. daily.  She has eliminated soda from her diet and avoids eating spicy and greasy foods.  She has no other complaints today.      PAST MEDICAL HISTORY:  History reviewed. No pertinent past medical history.    PAST SURGICAL HISTORY:  No past surgical history on file.    SOCIAL HISTORY:  Social History     Socioeconomic History    Marital status:    Tobacco Use    Smoking status: Never       FAMILY HISTORY:  Family History   Problem Relation Age of Onset    Breast cancer Sister 47       MEDICATIONS:  The current medication list was reviewed in the EMR    Current Outpatient Medications:     ALPRAZolam (XANAX) 0.25 MG tablet, , Disp: , Rfl:     cefdinir (OMNICEF) 300 MG capsule, Take 1 capsule by mouth 2 (Two) Times a Day., Disp: 14 capsule, Rfl: 0    ondansetron ODT (ZOFRAN-ODT) 4 MG disintegrating tablet, Place 1 tablet on the tongue 4 (Four) Times a Day., Disp: 15 tablet, Rfl: 0    pantoprazole (PROTONIX) 40 MG EC tablet, Take 1 tablet by mouth Daily., Disp: 30 tablet, Rfl: 5    sertraline (ZOLOFT) 25 MG tablet, Take 1 tablet by mouth Daily., Disp: , Rfl:     ALLERGIES:  No Known Allergies      REVIEW OF SYSTEMS:    A comprehensive 14 point review of systems was performed.  Significant findings as mentioned above.  All other systems reviewed and are negative.        Physical Exam   Vital Signs:   Vitals:    06/05/24 0950   BP: 131/64   Pulse: 68    General: Well developed, well nourished, alert and oriented x 3, in no acute distress.   Head: ATNC   Eyes: PERRL, No evidence of  conjunctivitis.   Nose: No nasal discharge.   Mouth: Oral mucosal membranes moist. No oral ulceration or hemorrhages.   Neck: Neck supple. No thyromegaly. No JVD.   Lungs: Clear in all fields to A&P without rales, rhonchi or wheezing.   Heart:  Regular rate and rhythm. No murmurs, rubs, or gallops.   Abdomen: Soft. Bowel sounds are normoactive. Nontender with palpation.   Extremities: No cyanosis or edema.   Neurologic: MS as above. Grossly non focal exam.      IMAGIN23  Narrative & Impression   EXAMINATION: US LIVER-      CLINICAL INDICATION:R74.01; R74.01-Elevation of levels of liver  transaminase levels     COMPARISON: None      TECHNIQUE: Sonographic imaging obtained in transverse and sagittal  planes of the liver. Color Doppler implemented.     FINDINGS:   Heterogeneous liver echotexture which can be seen with underlying  hepatocellular disease. No focal lesion.     No intrahepatic biliary dilatation noted.  Liver size appears within normal limits.  No perihepatic ascites identified.  Common bile duct is within normal limits and is:Millimeter.        IMPRESSION:  Heterogeneous liver echotexture which is nonspecific but can be seen  with underlying hepatocellular disease. Otherwise unremarkable exam.     This report was finalized on 2023 11:37 AM by Dr. Gino Stokes MD.          RECENT LABS:  Lab Results   Component Value Date    WBC 8.56 2024    HGB 13.0 2024    HCT 41.4 2024    MCV 98.1 (H) 2024    RDW 12.6 2024     2024    NEUTRORELPCT 62.0 2024    LYMPHORELPCT 21.0 2024    MONORELPCT 15.3 (H) 2024    EOSRELPCT 0.8 2024    BASORELPCT 0.7 2024    NEUTROABS 5.30 2024    LYMPHSABS 1.80 2024       Lab Results   Component Value Date     2024    K 4.1 2024    CO2 26.3 2024     2024    BUN 9 2024    CREATININE 0.91 2024    EGFRIFNONA 74 2019    GLUCOSE 123 (H)  01/25/2024    CALCIUM 9.3 01/25/2024    ALKPHOS 134 (H) 01/25/2024    AST 22 01/25/2024    ALT 18 01/25/2024    BILITOT 0.4 01/25/2024    ALBUMIN 3.8 01/25/2024    PROTEINTOT 7.3 01/25/2024    MG 1.9 01/25/2024         Component  Ref Range & Units 2 mo ago   AFP Value  <10.0 ng/mL 10.9 High         ASSESSMENT & PLAN:  Nila Cho is a very pleasant 79 y.o. female with    1.  Mild fatty liver:  2. Mildly elevated Alk phos:  3. Elevated AFP with abnormal US liver:    -Abdominal US in July 2023 showed heterogenous liver echotexture which is nonspecific but can be seen with underlying hepatocellular disease. She denies history of alcohol abuse. Denies excessive tylenol use. She denies having DM II or HLD. Her BMI is 18.42.  - Workup was obtained on 9/8/23. CMP showed mildly elevated Alk phos 121 with normal AST, ALT and total bilirubin. CBC showed normal counts. Acute hepatitis panel was negative. PT/INR were normal. Anti microsomal Ab, Anti smooth muscle Ab and Mitochondrial Ab were all normal. Iron panel and ferritin were normal. AFP was mildly elevated ~10.20. LUKE fibrosure showed mild steatosis with no fibrosis.   -Given AFP remained elevated, obtained CT abdomen with and without contrast  on 1/3/2024 which revealed a low-attenuation lesion in the liver most compatible with hemangioma.  In February 2024, ultrasound of the liver which was previously ordered was also obtained and reported coarse echotexture of the liver suggestive of cirrhosis which was not mentioned on previous CT imaging in January. After discussion of imaging findings as well as previous liver workup with Dr. Noriega, it was recommended to refer patient to [facility] hepatology for second opinion. She was evaluated by Dr. Merino who felt elevated Alk phos was not related to liver origin and recommended DEXA be done with PCP to evaluate bone health.  Dexa was done on 4/5/2024 and revealed osteoporosis which is being managed by her PCP. Repeat AFP  remained elevated on 3/20/2024, 10.9.  Therefore,  plan is to obtain MRI liver with follow up in September.    -At present, clinically she is doing well.  Will repeat CMP today.  -Patient was advised to keep follow-up with [facility] hepatology in September.  Will plan to follow-up in 6 months.      Electronically Signed by: ELVER Avina ,  June 4, 2024 14:53 EDT       CC:   Riley Rodriguez MD

## 2024-06-05 ENCOUNTER — OFFICE VISIT (OUTPATIENT)
Dept: GASTROENTEROLOGY | Facility: CLINIC | Age: 79
End: 2024-06-05
Payer: MEDICARE

## 2024-06-05 VITALS
SYSTOLIC BLOOD PRESSURE: 131 MMHG | WEIGHT: 107 LBS | HEART RATE: 68 BPM | BODY MASS INDEX: 18.96 KG/M2 | DIASTOLIC BLOOD PRESSURE: 64 MMHG | HEIGHT: 63 IN

## 2024-06-05 DIAGNOSIS — K76.0 FATTY LIVER: ICD-10-CM

## 2024-06-05 DIAGNOSIS — R77.2 ELEVATED AFP: Primary | ICD-10-CM

## 2024-06-05 LAB
ALBUMIN SERPL-MCNC: 4 G/DL (ref 3.5–5.2)
ALBUMIN/GLOB SERPL: 1.1 G/DL
ALP SERPL-CCNC: 107 U/L (ref 39–117)
ALT SERPL W P-5'-P-CCNC: 17 U/L (ref 1–33)
ANION GAP SERPL CALCULATED.3IONS-SCNC: 9.1 MMOL/L (ref 5–15)
AST SERPL-CCNC: 23 U/L (ref 1–32)
BILIRUB SERPL-MCNC: 0.3 MG/DL (ref 0–1.2)
BUN SERPL-MCNC: 14 MG/DL (ref 8–23)
BUN/CREAT SERPL: 18.2 (ref 7–25)
CALCIUM SPEC-SCNC: 9.7 MG/DL (ref 8.6–10.5)
CHLORIDE SERPL-SCNC: 103 MMOL/L (ref 98–107)
CO2 SERPL-SCNC: 26.9 MMOL/L (ref 22–29)
CREAT SERPL-MCNC: 0.77 MG/DL (ref 0.57–1)
EGFRCR SERPLBLD CKD-EPI 2021: 79.6 ML/MIN/1.73
GLOBULIN UR ELPH-MCNC: 3.5 GM/DL
GLUCOSE SERPL-MCNC: 70 MG/DL (ref 65–99)
POTASSIUM SERPL-SCNC: 4.1 MMOL/L (ref 3.5–5.2)
PROT SERPL-MCNC: 7.5 G/DL (ref 6–8.5)
SODIUM SERPL-SCNC: 139 MMOL/L (ref 136–145)

## 2024-06-05 PROCEDURE — 1159F MED LIST DOCD IN RCRD: CPT | Performed by: NURSE PRACTITIONER

## 2024-06-05 PROCEDURE — 99214 OFFICE O/P EST MOD 30 MIN: CPT | Performed by: NURSE PRACTITIONER

## 2024-06-05 PROCEDURE — 1160F RVW MEDS BY RX/DR IN RCRD: CPT | Performed by: NURSE PRACTITIONER

## 2024-06-05 PROCEDURE — 36415 COLL VENOUS BLD VENIPUNCTURE: CPT | Performed by: NURSE PRACTITIONER

## 2024-06-05 PROCEDURE — 80053 COMPREHEN METABOLIC PANEL: CPT | Performed by: NURSE PRACTITIONER

## 2024-06-06 ENCOUNTER — TELEPHONE (OUTPATIENT)
Dept: GASTROENTEROLOGY | Facility: CLINIC | Age: 79
End: 2024-06-06
Payer: COMMERCIAL

## 2024-06-06 NOTE — TELEPHONE ENCOUNTER
Please let patient know that her liver function tests are normal. Please keep follow up with [facility] hepatology as previously planned.

## 2024-06-06 NOTE — TELEPHONE ENCOUNTER
I spoke to patient, notified her that per Julia, Please let patient know that her liver function tests are normal. Please keep follow up with [facility] hepatology as previously planned. Patient verbalized understanding.

## 2024-10-08 DIAGNOSIS — K21.9 GASTROESOPHAGEAL REFLUX DISEASE, UNSPECIFIED WHETHER ESOPHAGITIS PRESENT: ICD-10-CM

## 2024-10-08 RX ORDER — PANTOPRAZOLE SODIUM 40 MG/1
40 TABLET, DELAYED RELEASE ORAL DAILY
Qty: 30 TABLET | Refills: 5 | Status: SHIPPED | OUTPATIENT
Start: 2024-10-08

## 2024-12-04 ENCOUNTER — OFFICE VISIT (OUTPATIENT)
Dept: GASTROENTEROLOGY | Facility: CLINIC | Age: 79
End: 2024-12-04
Payer: MEDICARE

## 2024-12-04 VITALS
WEIGHT: 108 LBS | HEIGHT: 63 IN | BODY MASS INDEX: 19.14 KG/M2 | DIASTOLIC BLOOD PRESSURE: 79 MMHG | HEART RATE: 76 BPM | SYSTOLIC BLOOD PRESSURE: 139 MMHG

## 2024-12-04 DIAGNOSIS — K76.9 LIVER LESION: ICD-10-CM

## 2024-12-04 DIAGNOSIS — K74.60 CIRRHOSIS OF LIVER WITHOUT ASCITES, UNSPECIFIED HEPATIC CIRRHOSIS TYPE: ICD-10-CM

## 2024-12-04 DIAGNOSIS — K59.04 CHRONIC IDIOPATHIC CONSTIPATION: Primary | ICD-10-CM

## 2024-12-04 DIAGNOSIS — R77.2 ELEVATED AFP: ICD-10-CM

## 2024-12-04 DIAGNOSIS — K21.9 GASTROESOPHAGEAL REFLUX DISEASE, UNSPECIFIED WHETHER ESOPHAGITIS PRESENT: ICD-10-CM

## 2024-12-04 DIAGNOSIS — D18.03 HEMANGIOMA OF LIVER: ICD-10-CM

## 2024-12-04 DIAGNOSIS — R74.8 ELEVATED ALKALINE PHOSPHATASE LEVEL: ICD-10-CM

## 2024-12-04 PROCEDURE — 80053 COMPREHEN METABOLIC PANEL: CPT | Performed by: NURSE PRACTITIONER

## 2024-12-04 PROCEDURE — 1160F RVW MEDS BY RX/DR IN RCRD: CPT | Performed by: NURSE PRACTITIONER

## 2024-12-04 PROCEDURE — 36415 COLL VENOUS BLD VENIPUNCTURE: CPT | Performed by: NURSE PRACTITIONER

## 2024-12-04 PROCEDURE — 99214 OFFICE O/P EST MOD 30 MIN: CPT | Performed by: NURSE PRACTITIONER

## 2024-12-04 PROCEDURE — 1159F MED LIST DOCD IN RCRD: CPT | Performed by: NURSE PRACTITIONER

## 2024-12-04 PROCEDURE — 82105 ALPHA-FETOPROTEIN SERUM: CPT | Performed by: NURSE PRACTITIONER

## 2024-12-04 NOTE — PROGRESS NOTES
DATE:  12/4/2024    DIAGNOSIS:  Elevated Alk phos, elevated AFP, liver hemangiomas    CHIEF COMPLAINT:  Follow up of liver workup    HPI:  Ms. Cho presents today for follow up, accompanied by her son. She was initially evaluated by Robert Storm PA-C in September 2023 for evaluation of elevated LFTs and abnormal liver US.  Please see previous note for full details. In brief, she underwent lab testing in July 2023 with PCP which revealed mildly elevated LFTs. She subsequently underwent abdominal US in July 2023 which showed heterogenous liver echotexture which is nonspecific but can be seen with underlying hepatocellular disease. She denies history of alcohol abuse. Denies excessive tylenol use. She denies having DM II or HLD. Her BMI is 18.42. Workup was obtained on 9/8/23. CMP showed mildly elevated Alk phos 121 with normal AST, ALT and total bilirubin. CBC showed normal counts. Acute hepatitis panel was negative. PT/INR were normal. Anti microsomal Ab, Anti smooth muscle Ab and Mitochondrial Ab were all normal. Iron panel and ferritin were normal. AFP was mildly elevated ~10.20. LUKE fibrosure showed mild steatosis with no fibrosis. At present, clinically she is doing well. She has no specific complaints today other than she is very concerned about having liver disease.     INTERVAL HISTORY:  Ms. Cho presents today for follow up.  Since her last visit, she underwent MRI of the abdomen with and without contrast on 9/18/2024 as ordered by [facility].  MRI of the abdomen showed no steatosis or iron deposition.  No morphologic features of cirrhosis.  She was found to have liver hemangiomas, requiring no further workup.  At present, she does not have planned follow-up with [facility].  Again, [facility] felt Alk phos was not related to liver origin and recommended DEXA be done with PCP to evaluate bone health.  Dexa was done on 4/5/2024 and revealed osteoporosis which is being managed by her PCP.  Per  [facility], AFP elevated for unknown reason and recommended repeat AFP every 6 months for trends.  At present, clinically she is doing well.  Her main complaint today is difficulty with constipation.  She currently reports her bowels move 1-2 times per week with stool type I or V on Cunningham stool scale.  She has been using MiraLAX as needed which has not been helpful.  She has associated abdominal bloating and incomplete evacuation of her colon.  She has occasional nausea without vomiting.  She denies melena or rectal bleeding.  In regards to GERD, this remains well-controlled with Protonix 40 mg p.o. daily. She has no other complaints today.    PAST MEDICAL HISTORY:  History reviewed. No pertinent past medical history.    PAST SURGICAL HISTORY:  No past surgical history on file.    SOCIAL HISTORY:  Social History     Socioeconomic History    Marital status:    Tobacco Use    Smoking status: Never       FAMILY HISTORY:  Family History   Problem Relation Age of Onset    Breast cancer Sister 47       MEDICATIONS:  The current medication list was reviewed in the EMR    Current Outpatient Medications:     ALPRAZolam (XANAX) 0.25 MG tablet, , Disp: , Rfl:     cefdinir (OMNICEF) 300 MG capsule, Take 1 capsule by mouth 2 (Two) Times a Day., Disp: 14 capsule, Rfl: 0    ondansetron ODT (ZOFRAN-ODT) 4 MG disintegrating tablet, Place 1 tablet on the tongue 4 (Four) Times a Day., Disp: 15 tablet, Rfl: 0    pantoprazole (PROTONIX) 40 MG EC tablet, Take 1 tablet by mouth Daily., Disp: 30 tablet, Rfl: 5    sertraline (ZOLOFT) 25 MG tablet, Take 1 tablet by mouth Daily., Disp: , Rfl:     ALLERGIES:  No Known Allergies      REVIEW OF SYSTEMS:    A comprehensive 14 point review of systems was performed.  Significant findings as mentioned above.  All other systems reviewed and are negative.        Physical Exam   Vital Signs:   Vitals:    12/04/24 1004   BP: 139/79   Pulse: 76    General: Elderly ,well developed, well  nourished, alert and oriented x 3, in no acute distress.   Head: ATNC   Eyes: PERRL, No evidence of conjunctivitis.   Nose: No nasal discharge.   Mouth: Oral mucosal membranes moist. No oral ulceration or hemorrhages.   Neck: Neck supple. No thyromegaly. No JVD.   Lungs: Clear in all fields to A&P without rales, rhonchi or wheezing.   Heart:  Regular rate and rhythm. No murmurs, rubs, or gallops.   Abdomen: Soft. Bowel sounds are normoactive. Nontender with palpation.   Extremities: No cyanosis or edema.   Neurologic: MS as above. Grossly non focal exam.      IMAGIN23  Narrative & Impression   EXAMINATION: US LIVER-      CLINICAL INDICATION:R74.01; R74.01-Elevation of levels of liver  transaminase levels     COMPARISON: None      TECHNIQUE: Sonographic imaging obtained in transverse and sagittal  planes of the liver. Color Doppler implemented.     FINDINGS:   Heterogeneous liver echotexture which can be seen with underlying  hepatocellular disease. No focal lesion.     No intrahepatic biliary dilatation noted.  Liver size appears within normal limits.  No perihepatic ascites identified.  Common bile duct is within normal limits and is:Millimeter.        IMPRESSION:  Heterogeneous liver echotexture which is nonspecific but can be seen  with underlying hepatocellular disease. Otherwise unremarkable exam.     This report was finalized on 2023 11:37 AM by Dr. Gino Stokes MD.          RECENT LABS:  Lab Results   Component Value Date    WBC 8.56 2024    HGB 13.0 2024    HCT 41.4 2024    MCV 98.1 (H) 2024    RDW 12.6 2024     2024    NEUTRORELPCT 62.0 2024    LYMPHORELPCT 21.0 2024    MONORELPCT 15.3 (H) 2024    EOSRELPCT 0.8 2024    BASORELPCT 0.7 2024    NEUTROABS 5.30 2024    LYMPHSABS 1.80 2024       Lab Results   Component Value Date     2024    K 4.1 2024    CO2 26.9 2024     2024     BUN 14 06/05/2024    CREATININE 0.77 06/05/2024    EGFRIFNONA 74 02/20/2019    EGFRIFAFRI 58 09/18/2024    GLUCOSE 70 06/05/2024    CALCIUM 9.7 06/05/2024    ALKPHOS 107 06/05/2024    AST 23 06/05/2024    ALT 17 06/05/2024    BILITOT 0.3 06/05/2024    ALBUMIN 4.0 06/05/2024    PROTEINTOT 7.5 06/05/2024    MG 1.9 01/25/2024         Component  Ref Range & Units 2 mo ago   AFP Value  <10.0 ng/mL 10.9 High         ASSESSMENT & PLAN:  Nila Cho is a very pleasant 79 y.o. female with    1. Mildly elevated Alk phos:  2. Elevated AFP, unknown etiology:  3.  Liver hemangiomas:    -Abdominal US in July 2023 showed heterogenous liver echotexture which is nonspecific but can be seen with underlying hepatocellular disease. She denies history of alcohol abuse. Denies excessive tylenol use. She denies having DM II or HLD. Her BMI is 19.13.  - Workup was obtained on 9/8/23. CMP showed mildly elevated Alk phos 121 with normal AST, ALT and total bilirubin. CBC showed normal counts. Acute hepatitis panel was negative. PT/INR were normal. Anti microsomal Ab, Anti smooth muscle Ab and Mitochondrial Ab were all normal. Iron panel and ferritin were normal. AFP was mildly elevated ~10.20. LUKE fibrosure showed mild steatosis with no fibrosis.   -Given AFP remained elevated, obtained CT abdomen with and without contrast  on 1/3/2024 which revealed a low-attenuation lesion in the liver most compatible with hemangioma.  In February 2024, ultrasound of the liver which was previously ordered was also obtained and reported coarse echotexture of the liver suggestive of cirrhosis which was not mentioned on previous CT imaging in January. After discussion of imaging findings as well as previous liver workup with Dr. Noriega, it was recommended to refer patient to [facility] for second opinion. She was evaluated by Dr. Merino who felt elevated Alk phos was not related to liver origin and recommended DEXA be done with PCP to evaluate bone  health.  Dexa was done on 4/5/2024 and revealed osteoporosis which is being managed by her PCP. Repeat AFP remained elevated.  Therefore, [facility] obtained MRI of the abdomen with and without contrast on 9/18/2024 which showed no steatosis or iron deposition.  No morphologic features of cirrhosis.  She was found to have liver hemangiomas, requiring no further workup.   -Will repeat CMP and AFP today.  Will monitor.    4.  Constipation:  -Patient has not had improvement with over-the-counter stool softeners.  Therefore, recommended trial of Linzess 145 mcg p.o. daily.  Rx provided.  Will have patient return to clinic in 10 to 12 weeks for symptom check.    5.  GERD:  -Currently well-controlled with Protonix 40 mg p.o. daily.    Electronically Signed by: ELVER Avina ,  December 4, 2024 10:13 EST       CC:   Riley Rodriguez MD

## 2024-12-06 ENCOUNTER — TELEPHONE (OUTPATIENT)
Dept: GASTROENTEROLOGY | Facility: CLINIC | Age: 79
End: 2024-12-06
Payer: COMMERCIAL

## 2024-12-06 LAB
ALBUMIN SERPL-MCNC: 3.6 G/DL (ref 3.5–5.2)
ALBUMIN/GLOB SERPL: 0.9 G/DL
ALP SERPL-CCNC: 99 U/L (ref 39–117)
ALPHA-FETOPROTEIN: 9.79 NG/ML (ref 0–8.3)
ALT SERPL W P-5'-P-CCNC: 15 U/L (ref 1–33)
ANION GAP SERPL CALCULATED.3IONS-SCNC: 9.8 MMOL/L (ref 5–15)
AST SERPL-CCNC: 25 U/L (ref 1–32)
BILIRUB SERPL-MCNC: 0.4 MG/DL (ref 0–1.2)
BUN SERPL-MCNC: 11 MG/DL (ref 8–23)
BUN/CREAT SERPL: 12.1 (ref 7–25)
CALCIUM SPEC-SCNC: 9.6 MG/DL (ref 8.6–10.5)
CHLORIDE SERPL-SCNC: 105 MMOL/L (ref 98–107)
CO2 SERPL-SCNC: 25.2 MMOL/L (ref 22–29)
CREAT SERPL-MCNC: 0.91 MG/DL (ref 0.57–1)
EGFRCR SERPLBLD CKD-EPI 2021: 65.1 ML/MIN/1.73
GLOBULIN UR ELPH-MCNC: 3.8 GM/DL
GLUCOSE SERPL-MCNC: 72 MG/DL (ref 65–99)
POTASSIUM SERPL-SCNC: 4.6 MMOL/L (ref 3.5–5.2)
PROT SERPL-MCNC: 7.4 G/DL (ref 6–8.5)
SODIUM SERPL-SCNC: 140 MMOL/L (ref 136–145)

## 2024-12-06 NOTE — TELEPHONE ENCOUNTER
I spoke to patient, notified her that per Julia,   Please let patient know that repeat labs from 12/4/2024 including CMP showed that her liver function tests remain normal.  Her AFP level remains elevated but has recently improved, currently 9.79.  Will continue to monitor.  Please follow-up as scheduled. Patient verbalized understanding.

## 2024-12-06 NOTE — TELEPHONE ENCOUNTER
Please let patient know that repeat labs from 12/4/2024 including CMP showed that her liver function tests remain normal.  Her AFP level remains elevated but has recently improved, currently 9.79.  Will continue to monitor.  Please follow-up as scheduled.

## 2024-12-16 ENCOUNTER — TELEPHONE (OUTPATIENT)
Dept: UROLOGY | Facility: CLINIC | Age: 79
End: 2024-12-16

## 2024-12-16 DIAGNOSIS — K59.04 CHRONIC IDIOPATHIC CONSTIPATION: ICD-10-CM

## 2024-12-16 DIAGNOSIS — K58.1 IRRITABLE BOWEL SYNDROME WITH CONSTIPATION: Primary | ICD-10-CM

## 2024-12-18 ENCOUNTER — TELEPHONE (OUTPATIENT)
Dept: GASTROENTEROLOGY | Facility: CLINIC | Age: 79
End: 2024-12-18
Payer: COMMERCIAL

## 2024-12-18 NOTE — TELEPHONE ENCOUNTER
Patient called stating that Linzess is not working. She said she has not had a bm in several days. She said that her BP is elevated too 139/65.

## 2024-12-19 DIAGNOSIS — K58.1 IRRITABLE BOWEL SYNDROME WITH CONSTIPATION: Primary | ICD-10-CM

## 2024-12-23 ENCOUNTER — APPOINTMENT (OUTPATIENT)
Dept: CT IMAGING | Facility: HOSPITAL | Age: 79
End: 2024-12-23
Payer: MEDICARE

## 2024-12-23 ENCOUNTER — HOSPITAL ENCOUNTER (EMERGENCY)
Facility: HOSPITAL | Age: 79
Discharge: HOME | End: 2024-12-23
Payer: MEDICARE

## 2024-12-23 VITALS
BODY MASS INDEX: 19.14 KG/M2 | HEART RATE: 70 BPM | SYSTOLIC BLOOD PRESSURE: 146 MMHG | RESPIRATION RATE: 18 BRPM | TEMPERATURE: 98.2 F | DIASTOLIC BLOOD PRESSURE: 70 MMHG | WEIGHT: 108 LBS | HEIGHT: 63 IN | OXYGEN SATURATION: 100 %

## 2024-12-23 DIAGNOSIS — K62.5 PR (BLEEDING PER RECTUM): Primary | ICD-10-CM

## 2024-12-23 LAB
ALBUMIN SERPL-MCNC: 3.9 G/DL (ref 3.5–5.2)
ALBUMIN/GLOB SERPL: 1.2 G/DL
ALP SERPL-CCNC: 107 U/L (ref 39–117)
ALT SERPL W P-5'-P-CCNC: 11 U/L (ref 1–33)
ANION GAP SERPL CALCULATED.3IONS-SCNC: 10.4 MMOL/L (ref 5–15)
AST SERPL-CCNC: 18 U/L (ref 1–32)
BACTERIA UR QL AUTO: NORMAL /HPF
BASOPHILS # BLD AUTO: 0.03 10*3/MM3 (ref 0–0.2)
BASOPHILS NFR BLD AUTO: 0.4 % (ref 0–1.5)
BILIRUB SERPL-MCNC: 0.7 MG/DL (ref 0–1.2)
BILIRUB UR QL STRIP: NEGATIVE
BUN SERPL-MCNC: 9 MG/DL (ref 8–23)
BUN/CREAT SERPL: 10.6 (ref 7–25)
CALCIUM SPEC-SCNC: 9.3 MG/DL (ref 8.6–10.5)
CHLORIDE SERPL-SCNC: 103 MMOL/L (ref 98–107)
CLARITY UR: CLEAR
CO2 SERPL-SCNC: 25.6 MMOL/L (ref 22–29)
COLOR UR: YELLOW
CREAT SERPL-MCNC: 0.85 MG/DL (ref 0.57–1)
D-LACTATE SERPL-SCNC: 0.8 MMOL/L (ref 0.5–2)
DEPRECATED RDW RBC AUTO: 45.7 FL (ref 37–54)
EGFRCR SERPLBLD CKD-EPI 2021: 70.7 ML/MIN/1.73
EOSINOPHIL # BLD AUTO: 0.06 10*3/MM3 (ref 0–0.4)
EOSINOPHIL NFR BLD AUTO: 0.8 % (ref 0.3–6.2)
ERYTHROCYTE [DISTWIDTH] IN BLOOD BY AUTOMATED COUNT: 12.7 % (ref 12.3–15.4)
GLOBULIN UR ELPH-MCNC: 3.3 GM/DL
GLUCOSE SERPL-MCNC: 92 MG/DL (ref 65–99)
GLUCOSE UR STRIP-MCNC: NEGATIVE MG/DL
HCT VFR BLD AUTO: 39.5 % (ref 34–46.6)
HGB BLD-MCNC: 12.3 G/DL (ref 12–15.9)
HGB UR QL STRIP.AUTO: ABNORMAL
HOLD SPECIMEN: NORMAL
HOLD SPECIMEN: NORMAL
HYALINE CASTS UR QL AUTO: NORMAL /LPF
IMM GRANULOCYTES # BLD AUTO: 0.03 10*3/MM3 (ref 0–0.05)
IMM GRANULOCYTES NFR BLD AUTO: 0.4 % (ref 0–0.5)
KETONES UR QL STRIP: NEGATIVE
LEUKOCYTE ESTERASE UR QL STRIP.AUTO: NEGATIVE
LIPASE SERPL-CCNC: 29 U/L (ref 13–60)
LYMPHOCYTES # BLD AUTO: 1.86 10*3/MM3 (ref 0.7–3.1)
LYMPHOCYTES NFR BLD AUTO: 24.1 % (ref 19.6–45.3)
MCH RBC QN AUTO: 30.6 PG (ref 26.6–33)
MCHC RBC AUTO-ENTMCNC: 31.1 G/DL (ref 31.5–35.7)
MCV RBC AUTO: 98.3 FL (ref 79–97)
MONOCYTES # BLD AUTO: 0.96 10*3/MM3 (ref 0.1–0.9)
MONOCYTES NFR BLD AUTO: 12.5 % (ref 5–12)
NEUTROPHILS NFR BLD AUTO: 4.77 10*3/MM3 (ref 1.7–7)
NEUTROPHILS NFR BLD AUTO: 61.8 % (ref 42.7–76)
NITRITE UR QL STRIP: NEGATIVE
NRBC BLD AUTO-RTO: 0 /100 WBC (ref 0–0.2)
PH UR STRIP.AUTO: 6.5 [PH] (ref 5–8)
PLATELET # BLD AUTO: 228 10*3/MM3 (ref 140–450)
PMV BLD AUTO: 10.1 FL (ref 6–12)
POTASSIUM SERPL-SCNC: 4.1 MMOL/L (ref 3.5–5.2)
PROT SERPL-MCNC: 7.2 G/DL (ref 6–8.5)
PROT UR QL STRIP: NEGATIVE
RBC # BLD AUTO: 4.02 10*6/MM3 (ref 3.77–5.28)
RBC # UR STRIP: NORMAL /HPF
REF LAB TEST METHOD: NORMAL
SODIUM SERPL-SCNC: 139 MMOL/L (ref 136–145)
SP GR UR STRIP: <=1.005 (ref 1–1.03)
SQUAMOUS #/AREA URNS HPF: NORMAL /HPF
UROBILINOGEN UR QL STRIP: ABNORMAL
WBC # UR STRIP: NORMAL /HPF
WBC NRBC COR # BLD AUTO: 7.71 10*3/MM3 (ref 3.4–10.8)
WHOLE BLOOD HOLD COAG: NORMAL
WHOLE BLOOD HOLD SPECIMEN: NORMAL

## 2024-12-23 PROCEDURE — 99285 EMERGENCY DEPT VISIT HI MDM: CPT

## 2024-12-23 PROCEDURE — 74177 CT ABD & PELVIS W/CONTRAST: CPT

## 2024-12-23 PROCEDURE — 96374 THER/PROPH/DIAG INJ IV PUSH: CPT

## 2024-12-23 PROCEDURE — 25010000002 DIPHENHYDRAMINE PER 50 MG

## 2024-12-23 PROCEDURE — 81001 URINALYSIS AUTO W/SCOPE: CPT

## 2024-12-23 PROCEDURE — 83605 ASSAY OF LACTIC ACID: CPT

## 2024-12-23 PROCEDURE — 83690 ASSAY OF LIPASE: CPT

## 2024-12-23 PROCEDURE — 25510000001 IOPAMIDOL 61 % SOLUTION

## 2024-12-23 PROCEDURE — 36415 COLL VENOUS BLD VENIPUNCTURE: CPT

## 2024-12-23 PROCEDURE — 25010000002 ONDANSETRON PER 1 MG

## 2024-12-23 PROCEDURE — 80053 COMPREHEN METABOLIC PANEL: CPT

## 2024-12-23 PROCEDURE — 96375 TX/PRO/DX INJ NEW DRUG ADDON: CPT

## 2024-12-23 PROCEDURE — 85025 COMPLETE CBC W/AUTO DIFF WBC: CPT

## 2024-12-23 PROCEDURE — 74177 CT ABD & PELVIS W/CONTRAST: CPT | Performed by: RADIOLOGY

## 2024-12-23 RX ORDER — AMOXICILLIN 250 MG
2 CAPSULE ORAL ONCE
Status: COMPLETED | OUTPATIENT
Start: 2024-12-23 | End: 2024-12-23

## 2024-12-23 RX ORDER — ONDANSETRON 2 MG/ML
4 INJECTION INTRAMUSCULAR; INTRAVENOUS ONCE
Status: COMPLETED | OUTPATIENT
Start: 2024-12-23 | End: 2024-12-23

## 2024-12-23 RX ORDER — SODIUM CHLORIDE 0.9 % (FLUSH) 0.9 %
10 SYRINGE (ML) INJECTION AS NEEDED
Status: DISCONTINUED | OUTPATIENT
Start: 2024-12-23 | End: 2024-12-23 | Stop reason: HOSPADM

## 2024-12-23 RX ORDER — LACTULOSE 10 G/15ML
20 SOLUTION ORAL 2 TIMES DAILY
Qty: 900 ML | Refills: 0 | Status: SHIPPED | OUTPATIENT
Start: 2024-12-23 | End: 2025-01-07

## 2024-12-23 RX ORDER — IOPAMIDOL 612 MG/ML
100 INJECTION, SOLUTION INTRAVASCULAR
Status: COMPLETED | OUTPATIENT
Start: 2024-12-23 | End: 2024-12-23

## 2024-12-23 RX ORDER — AMOXICILLIN 250 MG
2 CAPSULE ORAL 2 TIMES DAILY
Qty: 60 TABLET | Refills: 0 | Status: SHIPPED | OUTPATIENT
Start: 2024-12-23 | End: 2025-01-07

## 2024-12-23 RX ORDER — DIPHENHYDRAMINE HYDROCHLORIDE 50 MG/ML
25 INJECTION INTRAMUSCULAR; INTRAVENOUS ONCE
Status: COMPLETED | OUTPATIENT
Start: 2024-12-23 | End: 2024-12-23

## 2024-12-23 RX ADMIN — SENNOSIDES AND DOCUSATE SODIUM 2 TABLET: 50; 8.6 TABLET ORAL at 09:31

## 2024-12-23 RX ADMIN — IOPAMIDOL 100 ML: 612 INJECTION, SOLUTION INTRAVENOUS at 12:15

## 2024-12-23 RX ADMIN — ONDANSETRON 4 MG: 2 INJECTION INTRAMUSCULAR; INTRAVENOUS at 09:30

## 2024-12-23 RX ADMIN — IOPAMIDOL 100 ML: 612 INJECTION, SOLUTION INTRAVENOUS at 12:20

## 2024-12-23 RX ADMIN — DIPHENHYDRAMINE HYDROCHLORIDE 25 MG: 50 INJECTION, SOLUTION INTRAMUSCULAR; INTRAVENOUS at 12:28

## 2024-12-23 NOTE — DISCHARGE INSTRUCTIONS
Have given the patient information sheet on rectal bleeding and hemorrhoids.  I recommended the patient sitz baths and over-the-counter medications that can be used (e.g. Anusol and Preparation H).  The patient should return to the emergency department if she develops any further rectal bleeding, especially if there are clots present and she has systemic symptoms (e.g. lightheadedness).    Refer the patient to Dr. Hampton for an outpatient gastroenterology follow-up.    I also recommended the patient follow-up with her PCP within the next week to ensure stability of her condition and suggest any further recommendations and outpatient management.    I have also recommend to the patient laxatives ( Senna and lactulose)

## 2024-12-23 NOTE — ED PROVIDER NOTES
"Subjective   History of Present Illness  Presents today with ongoing constipation.  The patient has never had any bowel movements and rather goes every ~ 2 to 3 days.  She has recently been started on Linzess by her PCP for these issues but has found that over the course of the last 3 weeks, since starting it, that the constipation has worsened and she is barely going 1-2 times per week.  She used a suppository within the last 24 hours and did not have a significant bowel movement but has noticed that in the last 2 days she has had blood both on the toilet paper and in the toilet bowl.  The patient has had a small amount of unintentional weight loss (\"just a few pounds\").  She has a strong family history of intra-abdominal cancers.  The patient has had prior colonoscopies with the last one was approximately 5 years ago.  The patient has no other GI or  symptoms, except for some ongoing nausea.      Review of Systems   Constitutional:  Positive for appetite change. Negative for activity change, chills, diaphoresis and fever.   HENT:  Negative for sore throat, tinnitus, trouble swallowing and voice change.    Gastrointestinal:  Positive for blood in stool, constipation and nausea. Negative for abdominal distention, abdominal pain, diarrhea and vomiting.   Genitourinary:  Negative for difficulty urinating, dysuria, frequency, hematuria and urgency.   Musculoskeletal:  Negative for back pain.   Psychiatric/Behavioral:  The patient is nervous/anxious.        No past medical history on file.    No Known Allergies    No past surgical history on file.    Family History   Problem Relation Age of Onset    Breast cancer Sister 47       Social History     Socioeconomic History    Marital status:    Tobacco Use    Smoking status: Never           Objective   Physical Exam  Vitals and nursing note reviewed.   Constitutional:       General: She is not in acute distress.     Appearance: She is normal weight. She is not " ill-appearing, toxic-appearing or diaphoretic.   HENT:      Head: Normocephalic and atraumatic.      Mouth/Throat:      Pharynx: Oropharynx is clear. No oropharyngeal exudate or posterior oropharyngeal erythema.   Eyes:      General:         Right eye: No discharge.         Left eye: No discharge.      Extraocular Movements: Extraocular movements intact.      Conjunctiva/sclera: Conjunctivae normal.      Pupils: Pupils are equal, round, and reactive to light.   Abdominal:      General: There is no distension.      Palpations: Abdomen is soft. There is no mass.      Tenderness: There is no abdominal tenderness.      Hernia: No hernia is present.   Skin:     Coloration: Skin is not jaundiced.      Findings: No bruising, erythema or rash.   Neurological:      General: No focal deficit present.      Mental Status: She is alert and oriented to person, place, and time. Mental status is at baseline.      Cranial Nerves: No cranial nerve deficit.   Psychiatric:         Behavior: Behavior normal.         Thought Content: Thought content normal.         Judgment: Judgment normal.         Procedures           ED Course  ED Course as of 12/23/24 1314   Mon Dec 23, 2024   0915 BP: 144/52 [RA]   0915 Temp: 98.2 °F (36.8 °C) [RA]   0915 Heart Rate: 80 [RA]   0915 Resp: 18 [RA]   0915 SpO2: 98 % [RA]   0915 Device (Oxygen Therapy): room air [RA]   1304 CT Abdomen Pelvis With Contrast  She will need follow-up for this uterine mass with ultrasound as an outpatient follow-up PCP [RA]   1309 Hemoglobin: 12.3 [RA]   1309 Platelets: 228 [RA]      ED Course User Index  [RA] Melvin Pablo MD                                                       Medical Decision Making      Final diagnoses:   NV (bleeding per rectum)       ED Disposition  ED Disposition       ED Disposition   Discharge    Condition   Stable    Comment   --               Riley Rodriguez MD  [facility] [address] [phone]    Schedule  an appointment as soon as possible for a visit in 1 week      Palma Hampton MD  [address] [facility]  [address]  [place] [address]  [phone]    Schedule an appointment as soon as possible for a visit in 1 week  Bleeding         Medication List        New Prescriptions      lactulose 10 GM/15ML solution  Commonly known as: CHRONULAC  Take 30 mL by mouth 2 (Two) Times a Day for 15 days.     sennosides-docusate 8.6-50 MG per tablet  Commonly known as: PERICOLACE  Take 2 tablets by mouth 2 (Two) Times a Day for 15 days.               Where to Get Your Medications        These medications were sent to [facility] [id number] - [place] - [address] AT [address] [place] [address] - [phone]  - [phone] FX  [address] [facility] [place] [address]      Phone: [phone]   lactulose 10 GM/15ML solution  sennosides-docusate 8.6-50 MG per tablet            Melvin Pablo MD  12/23/24 [id number]

## 2024-12-23 NOTE — ED NOTES
CT reports pt c/o hives and itching to chin after CT dye, Dr Pablo made aware. RBVO to administer IV benadryl.

## 2024-12-23 NOTE — ED NOTES
Patient made aware that we need a urine sample as soon as possible, patient stated that she did not feel like she could provide a sample at this time. Will try again in a timely manner, patient has a specimen cup at bedside

## 2025-01-06 ENCOUNTER — TELEPHONE (OUTPATIENT)
Dept: UROLOGY | Facility: CLINIC | Age: 80
End: 2025-01-06
Payer: COMMERCIAL

## 2025-01-06 NOTE — TELEPHONE ENCOUNTER
Patient forgot to tell at her appointment the ED wanted her to have colonoscopy.  She would like to be called back to discuss this.

## 2025-01-13 ENCOUNTER — TRANSCRIBE ORDERS (OUTPATIENT)
Dept: ADMINISTRATIVE | Facility: HOSPITAL | Age: 80
End: 2025-01-13
Payer: COMMERCIAL

## 2025-01-13 DIAGNOSIS — Z12.31 VISIT FOR SCREENING MAMMOGRAM: Primary | ICD-10-CM

## 2025-03-24 ENCOUNTER — OFFICE VISIT (OUTPATIENT)
Dept: GASTROENTEROLOGY | Facility: CLINIC | Age: 80
End: 2025-03-24
Payer: MEDICARE

## 2025-03-24 VITALS
HEART RATE: 75 BPM | SYSTOLIC BLOOD PRESSURE: 131 MMHG | HEIGHT: 63 IN | WEIGHT: 107 LBS | DIASTOLIC BLOOD PRESSURE: 64 MMHG | BODY MASS INDEX: 18.96 KG/M2

## 2025-03-24 DIAGNOSIS — K59.04 CHRONIC IDIOPATHIC CONSTIPATION: ICD-10-CM

## 2025-03-24 DIAGNOSIS — R74.8 ELEVATED ALKALINE PHOSPHATASE LEVEL: Primary | ICD-10-CM

## 2025-03-24 DIAGNOSIS — K21.9 GASTROESOPHAGEAL REFLUX DISEASE, UNSPECIFIED WHETHER ESOPHAGITIS PRESENT: ICD-10-CM

## 2025-03-24 DIAGNOSIS — D18.03 HEMANGIOMA OF LIVER: ICD-10-CM

## 2025-03-24 LAB
ALBUMIN SERPL-MCNC: 4 G/DL (ref 3.5–5.2)
ALBUMIN/GLOB SERPL: 1.2 G/DL
ALP SERPL-CCNC: 99 U/L (ref 39–117)
ALT SERPL W P-5'-P-CCNC: 10 U/L (ref 1–33)
ANION GAP SERPL CALCULATED.3IONS-SCNC: 10 MMOL/L (ref 5–15)
AST SERPL-CCNC: 16 U/L (ref 1–32)
BILIRUB SERPL-MCNC: 0.4 MG/DL (ref 0–1.2)
BUN SERPL-MCNC: 10 MG/DL (ref 8–23)
BUN/CREAT SERPL: 13 (ref 7–25)
CALCIUM SPEC-SCNC: 9.8 MG/DL (ref 8.6–10.5)
CHLORIDE SERPL-SCNC: 102 MMOL/L (ref 98–107)
CO2 SERPL-SCNC: 26 MMOL/L (ref 22–29)
CREAT SERPL-MCNC: 0.77 MG/DL (ref 0.57–1)
EGFRCR SERPLBLD CKD-EPI 2021: 79.6 ML/MIN/1.73
GLOBULIN UR ELPH-MCNC: 3.4 GM/DL
GLUCOSE SERPL-MCNC: 64 MG/DL (ref 65–99)
POTASSIUM SERPL-SCNC: 4.5 MMOL/L (ref 3.5–5.2)
PROT SERPL-MCNC: 7.4 G/DL (ref 6–8.5)
SODIUM SERPL-SCNC: 138 MMOL/L (ref 136–145)

## 2025-03-24 PROCEDURE — 99214 OFFICE O/P EST MOD 30 MIN: CPT | Performed by: NURSE PRACTITIONER

## 2025-03-24 PROCEDURE — 1159F MED LIST DOCD IN RCRD: CPT | Performed by: NURSE PRACTITIONER

## 2025-03-24 PROCEDURE — 36415 COLL VENOUS BLD VENIPUNCTURE: CPT | Performed by: NURSE PRACTITIONER

## 2025-03-24 PROCEDURE — 1160F RVW MEDS BY RX/DR IN RCRD: CPT | Performed by: NURSE PRACTITIONER

## 2025-03-24 PROCEDURE — 80053 COMPREHEN METABOLIC PANEL: CPT | Performed by: NURSE PRACTITIONER

## 2025-03-24 RX ORDER — PANTOPRAZOLE SODIUM 40 MG/1
40 TABLET, DELAYED RELEASE ORAL DAILY
Qty: 30 TABLET | Refills: 5 | Status: SHIPPED | OUTPATIENT
Start: 2025-03-24

## 2025-03-24 RX ORDER — AMOXICILLIN 250 MG
2 CAPSULE ORAL DAILY
COMMUNITY

## 2025-03-24 NOTE — PROGRESS NOTES
DATE:  3/24/2025    DIAGNOSIS:  Elevated Alk phos, elevated AFP, liver hemangiomas, constipation, GERD    CHIEF COMPLAINT:  Follow-up of constipation    HPI:  Ms. Cho presents today for follow up, accompanied by her son. She was initially evaluated by Robert Storm PA-C in September 2023 for evaluation of elevated LFTs and abnormal liver US.  Please see previous note for full details. In brief, she underwent lab testing in July 2023 with PCP which revealed mildly elevated LFTs. She subsequently underwent abdominal US in July 2023 which showed heterogenous liver echotexture which is nonspecific but can be seen with underlying hepatocellular disease. She denies history of alcohol abuse. Denies excessive tylenol use. She denies having DM II or HLD. Her BMI is 18.42. Workup was obtained on 9/8/23. CMP showed mildly elevated Alk phos 121 with normal AST, ALT and total bilirubin. CBC showed normal counts. Acute hepatitis panel was negative. PT/INR were normal. Anti microsomal Ab, Anti smooth muscle Ab and Mitochondrial Ab were all normal. Iron panel and ferritin were normal. AFP was mildly elevated ~10.20. LUKE fibrosure showed mild steatosis with no fibrosis. At present, clinically she is doing well. She has no specific complaints today other than she is very concerned about having liver disease.     INTERVAL HISTORY:  Ms. Cho presents today for follow up.  At her last visit, she was started on trial of Linzess for constipation without improvement.  She presented to the ED at [facility], [place] at the end of December 2024 with complaints of worsening constipation.  She underwent CT abdomen pelvis which I personally reviewed and showed large stool burden throughout the colon.  She also had scattered diverticula without evidence of diverticulitis.  Linzess was discontinued and she was started on senna-docusate sodium 2 tablets daily in the ED which has been very helpful.  She is currently having a BM daily to every other day with  stool type III on BSS.  She denies melena, hematochezia or rectal bleeding.  Patient also reports having lactulose and Trulance at home to take very occasionally if needed.  Patient reports colonoscopy was also recommended in the ED.She reports having previous colonoscopy~5 years ago which was unremarkable.  She has no family history of colon cancer. Discussed the above with Dr. Noriega as well.  Given age and comorbidities, Dr. Noriega recommended to hold off on colonoscopy given the above symptoms have improved based on risks/benefits.  Patient was in agreement.  In regards to GERD, this remains well-controlled with Protonix 40 mg p.o. daily.  She has no other complaints today.    PAST MEDICAL HISTORY:  History reviewed. No pertinent past medical history.    PAST SURGICAL HISTORY:  No past surgical history on file.    SOCIAL HISTORY:  Social History     Socioeconomic History    Marital status:    Tobacco Use    Smoking status: Never       FAMILY HISTORY:  Family History   Problem Relation Age of Onset    Breast cancer Sister 47       MEDICATIONS:  The current medication list was reviewed in the EMR    Current Outpatient Medications:     ALPRAZolam (XANAX) 0.25 MG tablet, , Disp: , Rfl:     ondansetron ODT (ZOFRAN-ODT) 4 MG disintegrating tablet, Place 1 tablet on the tongue 4 (Four) Times a Day., Disp: 15 tablet, Rfl: 0    pantoprazole (PROTONIX) 40 MG EC tablet, Take 1 tablet by mouth Daily., Disp: 30 tablet, Rfl: 5    Plecanatide 3 MG tablet, Take 1 tablet by mouth Daily., Disp: 30 tablet, Rfl: 5    sertraline (ZOLOFT) 25 MG tablet, Take 1 tablet by mouth Daily., Disp: , Rfl:     ALLERGIES:  No Known Allergies      REVIEW OF SYSTEMS:    A comprehensive 14 point review of systems was performed.  Significant findings as mentioned above.  All other systems reviewed and are negative.      Physical Exam   Vital Signs:   Vitals:    03/24/25 0951   BP: 131/64   Pulse: 75    General: Elderly ,well developed, well  nourished, alert and oriented x 3, in no acute distress.   Head: ATNC   Eyes: PERRL, No evidence of conjunctivitis.   Nose: No nasal discharge.   Mouth: Oral mucosal membranes moist. No oral ulceration or hemorrhages.   Neck: Neck supple. No thyromegaly. No JVD.   Lungs: Clear in all fields to A&P without rales, rhonchi or wheezing.   Heart:  Regular rate and rhythm. No murmurs, rubs, or gallops.   Abdomen: Soft. Bowel sounds are normoactive. Nontender with palpation.   Extremities: No cyanosis or edema.   Neurologic: MS as above. Grossly non focal exam.    IMAGIN23  Narrative & Impression   EXAMINATION: US LIVER-      CLINICAL INDICATION:R74.01; R74.01-Elevation of levels of liver  transaminase levels     COMPARISON: None      TECHNIQUE: Sonographic imaging obtained in transverse and sagittal  planes of the liver. Color Doppler implemented.     FINDINGS:   Heterogeneous liver echotexture which can be seen with underlying  hepatocellular disease. No focal lesion.     No intrahepatic biliary dilatation noted.  Liver size appears within normal limits.  No perihepatic ascites identified.  Common bile duct is within normal limits and is:Millimeter.        IMPRESSION:  Heterogeneous liver echotexture which is nonspecific but can be seen  with underlying hepatocellular disease. Otherwise unremarkable exam.     This report was finalized on 2023 11:37 AM by Dr. Gino Stokes MD.          RECENT LABS:  Lab Results   Component Value Date    WBC 7.71 2024    HGB 12.3 2024    HCT 39.5 2024    MCV 98.3 (H) 2024    RDW 12.7 2024     2024    NEUTRORELPCT 61.8 2024    LYMPHORELPCT 24.1 2024    MONORELPCT 12.5 (H) 2024    EOSRELPCT 0.8 2024    BASORELPCT 0.4 2024    NEUTROABS 4.77 2024    LYMPHSABS 1.86 2024       Lab Results   Component Value Date     2024    K 4.1 2024    CO2 25.6 2024     2024     BUN 9 12/23/2024    CREATININE 0.85 12/23/2024    EGFRIFNONA 74 02/20/2019    EGFRIFAFRI 58 09/18/2024    GLUCOSE 92 12/23/2024    CALCIUM 9.3 12/23/2024    ALKPHOS 107 12/23/2024    AST 18 12/23/2024    ALT 11 12/23/2024    BILITOT 0.7 12/23/2024    ALBUMIN 3.9 12/23/2024    PROTEINTOT 7.2 12/23/2024    MG 1.9 01/25/2024         Component  Ref Range & Units 2 mo ago   AFP Value  <10.0 ng/mL 10.9 High         ASSESSMENT & PLAN:  Nila Cho is a very pleasant 79 y.o. female with    1. Mildly elevated Alk phos:  2. Elevated AFP, unknown etiology:  3.  Liver hemangiomas:    -Abdominal US in July 2023 showed heterogenous liver echotexture which is nonspecific but can be seen with underlying hepatocellular disease. She denies history of alcohol abuse. Denies excessive tylenol use. She denies having DM II or HLD. Her BMI is 18.95.  - Workup was obtained on 9/8/23. CMP showed mildly elevated Alk phos 121 with normal AST, ALT and total bilirubin. CBC showed normal counts. Acute hepatitis panel was negative. PT/INR were normal. Anti microsomal Ab, Anti smooth muscle Ab and Mitochondrial Ab were all normal. Iron panel and ferritin were normal. AFP was mildly elevated ~10.20. LUKE fibrosure showed mild steatosis with no fibrosis.   -Given AFP remained elevated, obtained CT abdomen with and without contrast  on 1/3/2024 which revealed a low-attenuation lesion in the liver most compatible with hemangioma.  In February 2024, ultrasound of the liver which was previously ordered was also obtained and reported coarse echotexture of the liver suggestive of cirrhosis which was not mentioned on previous CT imaging in January. After discussion of imaging findings as well as previous liver workup with Dr. Noriega, it was recommended to refer patient to [facility] for second opinion. She was evaluated by Dr. Merino who felt elevated Alk phos was not related to liver origin and recommended DEXA be done with PCP to evaluate bone  health.  Dexa was done on 4/5/2024 and revealed osteoporosis which is being managed by her PCP. Repeat AFP remained elevated.  Therefore, [facility] obtained MRI of the abdomen with and without contrast on 9/18/2024 which showed no steatosis or iron deposition.  No morphologic features of cirrhosis.  She was found to have liver hemangiomas, requiring no further workup.  She also had recent CT abdomen pelvis performed in the ED on 12/23/2024 and per radiology, liver was unremarkable.  -Most recent labs from 12/4/2024 including CMP showed normalized LFTs.  CBC showed normal counts including platelets.  AFP remained elevated but had improved, 9.79.  -Will repeat CMP today.    4.  Constipation:  -Patient previously did not have improvement with over-the-counter stool softeners.  Therefore, recommended trial of Linzess 145 mcg p.o. daily.  Unfortunately, this was not helpful.  She was evaluated in the ED at the end of December 2024 who started her on senna-docusate sodium 2 tablets daily which is currently controlling constipation well.  Recommended she continue and will monitor.  Of note, patient reports colonoscopy was also recommended in the ED.She reports having previous colonoscopy~5 years ago which was unremarkable.  She has no family history of colon cancer. Discussed the above with Dr. Noriega as well.  Given age and comorbidities, Dr. Noriega recommended to hold off on colonoscopy given the above symptoms have improved based on risks/benefits.  Patient was in agreement.  Will monitor.      5.  GERD:  -Currently well-controlled with Protonix 40 mg p.o. daily.  Refills provided.    Patient will return to clinic in 6 months for symptom check or sooner if needed.    Electronically Signed by: ELVER Avina ,  March 24, 2025 09:56 EDT       CC:   Riley Rodriguez MD

## 2025-05-08 ENCOUNTER — HOSPITAL ENCOUNTER (OUTPATIENT)
Dept: MAMMOGRAPHY | Facility: HOSPITAL | Age: 80
Discharge: HOME | End: 2025-05-08
Admitting: FAMILY MEDICINE
Payer: MEDICARE

## 2025-05-08 DIAGNOSIS — Z12.31 VISIT FOR SCREENING MAMMOGRAM: ICD-10-CM

## 2025-05-08 PROCEDURE — 77067 SCR MAMMO BI INCL CAD: CPT

## 2025-05-08 PROCEDURE — 77067 SCR MAMMO BI INCL CAD: CPT | Performed by: RADIOLOGY

## 2025-05-08 PROCEDURE — 77063 BREAST TOMOSYNTHESIS BI: CPT

## 2025-05-08 PROCEDURE — 77063 BREAST TOMOSYNTHESIS BI: CPT | Performed by: RADIOLOGY
